# Patient Record
Sex: FEMALE | Race: WHITE | NOT HISPANIC OR LATINO | URBAN - METROPOLITAN AREA
[De-identification: names, ages, dates, MRNs, and addresses within clinical notes are randomized per-mention and may not be internally consistent; named-entity substitution may affect disease eponyms.]

---

## 2024-11-21 ENCOUNTER — APPOINTMENT (OUTPATIENT)
Dept: PRIMARY CARE | Facility: CLINIC | Age: 84
End: 2024-11-21
Payer: MEDICARE

## 2024-11-21 VITALS
OXYGEN SATURATION: 96 % | SYSTOLIC BLOOD PRESSURE: 169 MMHG | BODY MASS INDEX: 15.27 KG/M2 | HEIGHT: 66 IN | HEART RATE: 63 BPM | DIASTOLIC BLOOD PRESSURE: 74 MMHG | WEIGHT: 95 LBS

## 2024-11-21 DIAGNOSIS — I73.9 CLAUDICATION OF BOTH LOWER EXTREMITIES (CMS-HCC): ICD-10-CM

## 2024-11-21 DIAGNOSIS — R15.9 ENCOPRESIS: ICD-10-CM

## 2024-11-21 DIAGNOSIS — M1A.09X0 IDIOPATHIC CHRONIC GOUT OF MULTIPLE SITES WITHOUT TOPHUS: ICD-10-CM

## 2024-11-21 DIAGNOSIS — K52.9 CHRONIC DIARRHEA: ICD-10-CM

## 2024-11-21 DIAGNOSIS — I10 PRIMARY HYPERTENSION: Primary | ICD-10-CM

## 2024-11-21 DIAGNOSIS — M15.0 PRIMARY OSTEOARTHRITIS INVOLVING MULTIPLE JOINTS: ICD-10-CM

## 2024-11-21 DIAGNOSIS — M81.0 AGE-RELATED OSTEOPOROSIS WITHOUT CURRENT PATHOLOGICAL FRACTURE: ICD-10-CM

## 2024-11-21 DIAGNOSIS — G89.4 CHRONIC PAIN SYNDROME: ICD-10-CM

## 2024-11-21 DIAGNOSIS — M41.25 OTHER IDIOPATHIC SCOLIOSIS, THORACOLUMBAR REGION: ICD-10-CM

## 2024-11-21 PROBLEM — S82.831A CLOSED FRACTURE OF RIGHT DISTAL FIBULA: Status: RESOLVED | Noted: 2024-11-21 | Resolved: 2024-11-21

## 2024-11-21 PROBLEM — S99.921A INJURY OF RIGHT FOOT: Status: RESOLVED | Noted: 2024-11-21 | Resolved: 2024-11-21

## 2024-11-21 PROBLEM — G24.5 BLEPHAROSPASM: Status: RESOLVED | Noted: 2017-10-02 | Resolved: 2024-11-21

## 2024-11-21 PROBLEM — S99.911A INJURY OF RIGHT ANKLE: Status: RESOLVED | Noted: 2024-11-21 | Resolved: 2024-11-21

## 2024-11-21 PROBLEM — M76.891 HAMSTRING TENDONITIS OF RIGHT THIGH: Status: RESOLVED | Noted: 2024-11-21 | Resolved: 2024-11-21

## 2024-11-21 PROBLEM — S82.891A ANKLE FRACTURE, RIGHT: Status: RESOLVED | Noted: 2024-11-21 | Resolved: 2024-11-21

## 2024-11-21 PROCEDURE — 1159F MED LIST DOCD IN RCRD: CPT | Performed by: FAMILY MEDICINE

## 2024-11-21 PROCEDURE — 1158F ADVNC CARE PLAN TLK DOCD: CPT | Performed by: FAMILY MEDICINE

## 2024-11-21 PROCEDURE — 3078F DIAST BP <80 MM HG: CPT | Performed by: FAMILY MEDICINE

## 2024-11-21 PROCEDURE — 99203 OFFICE O/P NEW LOW 30 MIN: CPT | Performed by: FAMILY MEDICINE

## 2024-11-21 PROCEDURE — 1160F RVW MEDS BY RX/DR IN RCRD: CPT | Performed by: FAMILY MEDICINE

## 2024-11-21 PROCEDURE — 1125F AMNT PAIN NOTED PAIN PRSNT: CPT | Performed by: FAMILY MEDICINE

## 2024-11-21 PROCEDURE — 3077F SYST BP >= 140 MM HG: CPT | Performed by: FAMILY MEDICINE

## 2024-11-21 PROCEDURE — 1123F ACP DISCUSS/DSCN MKR DOCD: CPT | Performed by: FAMILY MEDICINE

## 2024-11-21 PROCEDURE — G2211 COMPLEX E/M VISIT ADD ON: HCPCS | Performed by: FAMILY MEDICINE

## 2024-11-21 RX ORDER — METOPROLOL SUCCINATE 50 MG/1
50 TABLET, EXTENDED RELEASE ORAL DAILY
COMMUNITY

## 2024-11-21 RX ORDER — ALLOPURINOL 300 MG/1
300 TABLET ORAL DAILY
COMMUNITY
Start: 2017-03-08

## 2024-11-21 RX ORDER — GABAPENTIN 300 MG/1
300 CAPSULE ORAL DAILY
COMMUNITY
Start: 2018-01-09

## 2024-11-21 ASSESSMENT — PATIENT HEALTH QUESTIONNAIRE - PHQ9
1. LITTLE INTEREST OR PLEASURE IN DOING THINGS: SEVERAL DAYS
10. IF YOU CHECKED OFF ANY PROBLEMS, HOW DIFFICULT HAVE THESE PROBLEMS MADE IT FOR YOU TO DO YOUR WORK, TAKE CARE OF THINGS AT HOME, OR GET ALONG WITH OTHER PEOPLE: SOMEWHAT DIFFICULT
SUM OF ALL RESPONSES TO PHQ9 QUESTIONS 1 AND 2: 2
2. FEELING DOWN, DEPRESSED OR HOPELESS: SEVERAL DAYS

## 2024-11-21 ASSESSMENT — COLUMBIA-SUICIDE SEVERITY RATING SCALE - C-SSRS
6. HAVE YOU EVER DONE ANYTHING, STARTED TO DO ANYTHING, OR PREPARED TO DO ANYTHING TO END YOUR LIFE?: NO
1. IN THE PAST MONTH, HAVE YOU WISHED YOU WERE DEAD OR WISHED YOU COULD GO TO SLEEP AND NOT WAKE UP?: NO
2. HAVE YOU ACTUALLY HAD ANY THOUGHTS OF KILLING YOURSELF?: NO

## 2024-11-21 ASSESSMENT — PAIN SCALES - GENERAL: PAINLEVEL_OUTOF10: 8

## 2024-11-21 NOTE — PROGRESS NOTES
"Subjective   Patient ID: Cecelia Chávez is a 84 y.o. female who presents for New Patient Visit (Pt has had diarrhea for a couple month now after eating /Pt has oosporosis and arthritis on both legs ).    The patient is new to the area and is transferring her care from Dr. Bassenite ([place]).  She is here to establish care.    1) HTN: uncontrolled, did not take metoprolol today, does not follow a low salt diet, no exercise routine.  2) Gout: controlled, compliant with allopurinol, prescribed by PCP.  3) Osteoporosis: controlled, no medication taken, takes Aleve as needed.    4) Arthritis: multiple areas, upper and lower extremities, and back, takes gabapentin which is not helping.    5) Scoliosis: stable, takes gabapentin which is not helping.  6) Claudication of both extremities: worsening, had an appointment to see vascular surgery in [place] prior to moving to [place], needs to establish with local vascular surgeon.  6) Chronic diarrhea/Encopresis: began months ago, patient had a CT of the abdomen and pelvis (possibly), no change in diet since the diarrhea began, anything she eats and drinks causes diarrhea.    Review of Systems   Gastrointestinal:  Positive for diarrhea.        Positive for encopuresis.   Musculoskeletal:  Positive for arthralgias.     Objective   /74 (BP Location: Right arm, Patient Position: Sitting, BP Cuff Size: Adult)   Pulse 63   Ht 1.676 m (5' 6\")   Wt (!) 43.1 kg (95 lb)   SpO2 96%   BMI 15.33 kg/m²     Physical Exam  Constitutional:       Appearance: Normal appearance.      Comments: Underweight, accompanied by daughter.   Cardiovascular:      Rate and Rhythm: Normal rate and regular rhythm.      Heart sounds: Normal heart sounds.   Pulmonary:      Effort: Pulmonary effort is normal.      Breath sounds: Normal breath sounds.   Neurological:      Mental Status: She is alert.     Assessment/Plan   Diagnoses and all orders for this visit:  Primary " hypertension  Uncontrolled  Patient instructed to resume taking metoprolol.  Low salt diet.  Regular exercise.  Manage weight.  Follow up for CPE after established with specialists.  Idiopathic chronic gout of multiple sites without tophus  Controlled  Continue with allopurinol.  Follow up for CPE after established with specialists.  Age-related osteoporosis without current pathological fracture  Controlled  No medication taken.  Continue with Aleve as needed.    Follow up for CPE after established with specialists.  Primary osteoarthritis involving multiple joints/Other idiopathic scoliosis, thoracolumbar region/Chronic pain syndrome  Stable   Continue with gabapentin until seen by pain management,  Referral to Pain Medicine  Await input.  Follow up as directed.  Claudication of both lower extremities (CMS-HCC)  Worsening  Referral to Vascular Surgery  Await input.  Follow up as directed.  Chronic diarrhea/Encopresis  Chronic  Referral to Gastroenterology  Await input.  Follow up as directed.

## 2024-11-23 PROBLEM — M1A.09X0 IDIOPATHIC CHRONIC GOUT OF MULTIPLE SITES WITHOUT TOPHUS: Status: ACTIVE | Noted: 2024-11-23

## 2024-11-23 PROBLEM — M41.25 OTHER IDIOPATHIC SCOLIOSIS, THORACOLUMBAR REGION: Status: ACTIVE | Noted: 2024-11-23

## 2024-11-23 PROBLEM — I10 PRIMARY HYPERTENSION: Status: ACTIVE | Noted: 2024-11-23

## 2024-11-23 PROBLEM — M15.0 PRIMARY OSTEOARTHRITIS INVOLVING MULTIPLE JOINTS: Status: ACTIVE | Noted: 2024-11-23

## 2024-11-23 ASSESSMENT — ENCOUNTER SYMPTOMS
DIARRHEA: 1
ARTHRALGIAS: 1

## 2024-12-17 ENCOUNTER — APPOINTMENT (OUTPATIENT)
Dept: PAIN MEDICINE | Facility: CLINIC | Age: 84
End: 2024-12-17
Payer: MEDICARE

## 2025-01-15 ENCOUNTER — APPOINTMENT (OUTPATIENT)
Dept: VASCULAR SURGERY | Facility: HOSPITAL | Age: 85
End: 2025-01-15
Payer: MEDICARE

## 2025-02-10 ENCOUNTER — APPOINTMENT (OUTPATIENT)
Dept: RADIOLOGY | Facility: HOSPITAL | Age: 85
DRG: 521 | End: 2025-02-10
Payer: MEDICARE

## 2025-02-10 ENCOUNTER — HOSPITAL ENCOUNTER (INPATIENT)
Facility: HOSPITAL | Age: 85
DRG: 521 | End: 2025-02-10
Admitting: INTERNAL MEDICINE
Payer: MEDICARE

## 2025-02-10 DIAGNOSIS — S72.002A CLOSED DISPLACED FRACTURE OF LEFT FEMORAL NECK: ICD-10-CM
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
## 2025-02-10 DIAGNOSIS — S72.032A CLOSED DISPLACED MIDCERVICAL FRACTURE OF LEFT FEMUR, INITIAL ENCOUNTER: ICD-10-CM

## 2025-02-10 DIAGNOSIS — R41.0 DELIRIUM: ICD-10-CM

## 2025-02-10 LAB
ALBUMIN SERPL BCP-MCNC: 4 G/DL (ref 3.4–5)
ALP SERPL-CCNC: 89 U/L (ref 33–136)
ALT SERPL W P-5'-P-CCNC: 8 U/L (ref 7–45)
ANION GAP SERPL CALCULATED.3IONS-SCNC: 11 MMOL/L (ref 10–20)
APTT PPP: 22.9 SECONDS (ref 22–32.5)
AST SERPL W P-5'-P-CCNC: 18 U/L (ref 9–39)
BASOPHILS # BLD AUTO: 0.06 X10*3/UL (ref 0–0.1)
BASOPHILS NFR BLD AUTO: 0.8 %
BILIRUB SERPL-MCNC: 0.4 MG/DL (ref 0–1.2)
BUN SERPL-MCNC: 19 MG/DL (ref 6–23)
CALCIUM SERPL-MCNC: 9.2 MG/DL (ref 8.6–10.3)
CHLORIDE SERPL-SCNC: 106 MMOL/L (ref 98–107)
CK SERPL-CCNC: 92 U/L (ref 0–215)
CO2 SERPL-SCNC: 29 MMOL/L (ref 21–32)
CREAT SERPL-MCNC: 0.94 MG/DL (ref 0.5–1.05)
EGFRCR SERPLBLD CKD-EPI 2021: 61 ML/MIN/1.73M*2
EOSINOPHIL # BLD AUTO: 0.1 X10*3/UL (ref 0–0.4)
EOSINOPHIL NFR BLD AUTO: 1.4 %
ERYTHROCYTE [DISTWIDTH] IN BLOOD BY AUTOMATED COUNT: 13.1 % (ref 11.5–14.5)
GLUCOSE SERPL-MCNC: 127 MG/DL (ref 74–99)
HCT VFR BLD AUTO: 35.2 % (ref 36–46)
HGB BLD-MCNC: 11.6 G/DL (ref 12–16)
IMM GRANULOCYTES # BLD AUTO: 0.02 X10*3/UL (ref 0–0.5)
IMM GRANULOCYTES NFR BLD AUTO: 0.3 % (ref 0–0.9)
INR PPP: 1.1 (ref 0.9–1.2)
LYMPHOCYTES # BLD AUTO: 0.93 X10*3/UL (ref 0.8–3)
LYMPHOCYTES NFR BLD AUTO: 12.7 %
MCH RBC QN AUTO: 33 PG (ref 26–34)
MCHC RBC AUTO-ENTMCNC: 33 G/DL (ref 32–36)
MCV RBC AUTO: 100 FL (ref 80–100)
MONOCYTES # BLD AUTO: 0.46 X10*3/UL (ref 0.05–0.8)
MONOCYTES NFR BLD AUTO: 6.3 %
NEUTROPHILS # BLD AUTO: 5.74 X10*3/UL (ref 1.6–5.5)
NEUTROPHILS NFR BLD AUTO: 78.5 %
NRBC BLD-RTO: 0 /100 WBCS (ref 0–0)
PLATELET # BLD AUTO: 214 X10*3/UL (ref 150–450)
POTASSIUM SERPL-SCNC: 3.8 MMOL/L (ref 3.5–5.3)
PROT SERPL-MCNC: 6.4 G/DL (ref 6.4–8.2)
PROTHROMBIN TIME: 11.4 SECONDS (ref 9.3–12.7)
RBC # BLD AUTO: 3.51 X10*6/UL (ref 4–5.2)
SODIUM SERPL-SCNC: 142 MMOL/L (ref 136–145)
WBC # BLD AUTO: 7.3 X10*3/UL (ref 4.4–11.3)

## 2025-02-10 PROCEDURE — 36415 COLL VENOUS BLD VENIPUNCTURE: CPT | Performed by: PHYSICIAN ASSISTANT

## 2025-02-10 PROCEDURE — 96375 TX/PRO/DX INJ NEW DRUG ADDON: CPT

## 2025-02-10 PROCEDURE — 72128 CT CHEST SPINE W/O DYE: CPT

## 2025-02-10 PROCEDURE — 70450 CT HEAD/BRAIN W/O DYE: CPT | Performed by: RADIOLOGY

## 2025-02-10 PROCEDURE — 72128 CT CHEST SPINE W/O DYE: CPT | Performed by: RADIOLOGY

## 2025-02-10 PROCEDURE — 72125 CT NECK SPINE W/O DYE: CPT | Performed by: RADIOLOGY

## 2025-02-10 PROCEDURE — 72131 CT LUMBAR SPINE W/O DYE: CPT | Performed by: RADIOLOGY

## 2025-02-10 PROCEDURE — 85025 COMPLETE CBC W/AUTO DIFF WBC: CPT | Performed by: PHYSICIAN ASSISTANT

## 2025-02-10 PROCEDURE — 71045 X-RAY EXAM CHEST 1 VIEW: CPT | Performed by: RADIOLOGY

## 2025-02-10 PROCEDURE — 71045 X-RAY EXAM CHEST 1 VIEW: CPT

## 2025-02-10 PROCEDURE — 2500000004 HC RX 250 GENERAL PHARMACY W/ HCPCS (ALT 636 FOR OP/ED): Performed by: PHYSICIAN ASSISTANT

## 2025-02-10 PROCEDURE — 72125 CT NECK SPINE W/O DYE: CPT

## 2025-02-10 PROCEDURE — 70450 CT HEAD/BRAIN W/O DYE: CPT

## 2025-02-10 PROCEDURE — 99223 1ST HOSP IP/OBS HIGH 75: CPT | Performed by: INTERNAL MEDICINE

## 2025-02-10 PROCEDURE — 73502 X-RAY EXAM HIP UNI 2-3 VIEWS: CPT | Mod: LT

## 2025-02-10 PROCEDURE — 1210000001 HC SEMI-PRIVATE ROOM DAILY

## 2025-02-10 PROCEDURE — 72131 CT LUMBAR SPINE W/O DYE: CPT

## 2025-02-10 PROCEDURE — 82550 ASSAY OF CK (CPK): CPT | Performed by: PHYSICIAN ASSISTANT

## 2025-02-10 PROCEDURE — 85730 THROMBOPLASTIN TIME PARTIAL: CPT | Performed by: PHYSICIAN ASSISTANT

## 2025-02-10 PROCEDURE — 96374 THER/PROPH/DIAG INJ IV PUSH: CPT

## 2025-02-10 PROCEDURE — 96376 TX/PRO/DX INJ SAME DRUG ADON: CPT

## 2025-02-10 PROCEDURE — 80053 COMPREHEN METABOLIC PANEL: CPT | Performed by: PHYSICIAN ASSISTANT

## 2025-02-10 PROCEDURE — 96361 HYDRATE IV INFUSION ADD-ON: CPT

## 2025-02-10 PROCEDURE — 73502 X-RAY EXAM HIP UNI 2-3 VIEWS: CPT | Mod: LEFT SIDE | Performed by: RADIOLOGY

## 2025-02-10 PROCEDURE — 85610 PROTHROMBIN TIME: CPT | Performed by: PHYSICIAN ASSISTANT

## 2025-02-10 PROCEDURE — 99285 EMERGENCY DEPT VISIT HI MDM: CPT | Mod: 25

## 2025-02-10 RX ORDER — ONDANSETRON HYDROCHLORIDE 2 MG/ML
4 INJECTION, SOLUTION INTRAVENOUS EVERY 4 HOURS PRN
Status: DISCONTINUED | OUTPATIENT
Start: 2025-02-10 | End: 2025-02-17 | Stop reason: HOSPADM

## 2025-02-10 RX ORDER — MORPHINE SULFATE 2 MG/ML
2 INJECTION, SOLUTION INTRAMUSCULAR; INTRAVENOUS
Status: DISCONTINUED | OUTPATIENT
Start: 2025-02-10 | End: 2025-02-11 | Stop reason: ALTCHOICE

## 2025-02-10 RX ORDER — HEPARIN SODIUM 5000 [USP'U]/ML
5000 INJECTION, SOLUTION INTRAVENOUS; SUBCUTANEOUS ONCE
Status: COMPLETED | OUTPATIENT
Start: 2025-02-10 | End: 2025-02-11

## 2025-02-10 RX ORDER — FENTANYL CITRATE 50 UG/ML
100 INJECTION, SOLUTION INTRAMUSCULAR; INTRAVENOUS ONCE
Status: COMPLETED | OUTPATIENT
Start: 2025-02-10 | End: 2025-02-10

## 2025-02-10 RX ORDER — ONDANSETRON HYDROCHLORIDE 2 MG/ML
4 INJECTION, SOLUTION INTRAVENOUS ONCE
Status: COMPLETED | OUTPATIENT
Start: 2025-02-10 | End: 2025-02-10

## 2025-02-10 RX ORDER — FENTANYL CITRATE 50 UG/ML
50 INJECTION, SOLUTION INTRAMUSCULAR; INTRAVENOUS ONCE
Status: COMPLETED | OUTPATIENT
Start: 2025-02-10 | End: 2025-02-10

## 2025-02-10 RX ORDER — CLOTRIMAZOLE 1 %
CREAM (GRAM) TOPICAL 2 TIMES DAILY
Status: DISCONTINUED | OUTPATIENT
Start: 2025-02-10 | End: 2025-02-17 | Stop reason: HOSPADM

## 2025-02-10 RX ORDER — PANTOPRAZOLE SODIUM 40 MG/1
40 TABLET, DELAYED RELEASE ORAL
Status: DISCONTINUED | OUTPATIENT
Start: 2025-02-11 | End: 2025-02-17 | Stop reason: HOSPADM

## 2025-02-10 RX ORDER — MORPHINE SULFATE 4 MG/ML
4 INJECTION, SOLUTION INTRAMUSCULAR; INTRAVENOUS ONCE
Status: COMPLETED | OUTPATIENT
Start: 2025-02-10 | End: 2025-02-10

## 2025-02-10 RX ADMIN — MORPHINE SULFATE 4 MG: 4 INJECTION, SOLUTION INTRAMUSCULAR; INTRAVENOUS at 16:14

## 2025-02-10 RX ADMIN — FENTANYL CITRATE 100 MCG: 50 INJECTION, SOLUTION INTRAMUSCULAR; INTRAVENOUS at 19:22

## 2025-02-10 RX ADMIN — ONDANSETRON 4 MG: 2 INJECTION INTRAMUSCULAR; INTRAVENOUS at 16:12

## 2025-02-10 RX ADMIN — SODIUM CHLORIDE 500 ML: 900 INJECTION, SOLUTION INTRAVENOUS at 16:13

## 2025-02-10 RX ADMIN — FENTANYL CITRATE 50 MCG: 0.05 INJECTION, SOLUTION INTRAMUSCULAR; INTRAVENOUS at 17:32

## 2025-02-10 RX ADMIN — FENTANYL CITRATE 50 MCG: 0.05 INJECTION, SOLUTION INTRAMUSCULAR; INTRAVENOUS at 21:13

## 2025-02-10 ASSESSMENT — PAIN SCALES - GENERAL
PAINLEVEL_OUTOF10: 10 - WORST POSSIBLE PAIN
PAINLEVEL_OUTOF10: 10 - WORST POSSIBLE PAIN

## 2025-02-10 ASSESSMENT — LIFESTYLE VARIABLES
EVER HAD A DRINK FIRST THING IN THE MORNING TO STEADY YOUR NERVES TO GET RID OF A HANGOVER: NO
TOTAL SCORE: 0
HAVE YOU EVER FELT YOU SHOULD CUT DOWN ON YOUR DRINKING: NO
EVER FELT BAD OR GUILTY ABOUT YOUR DRINKING: NO
HAVE PEOPLE ANNOYED YOU BY CRITICIZING YOUR DRINKING: NO

## 2025-02-10 ASSESSMENT — PAIN DESCRIPTION - LOCATION: LOCATION: HIP

## 2025-02-10 ASSESSMENT — PAIN DESCRIPTION - ORIENTATION: ORIENTATION: LEFT

## 2025-02-10 NOTE — ED PROVIDER NOTES
HPI   Chief Complaint   Patient presents with    Fall       This is a 84-year-old female presenting to the emergency department for mechanical fall.  Patient states that she recently moved into a new home.  She states that she was unpacking when she was going to walk to her walker because typically she uses her walker when she felt her legs give out and she fell to the ground.  She states she did hit her head.  She does not take any blood thinners.  There is no loss of conscious.  Patient states that she was not able to get up off the ground due to pain in her left hip.  She states that she was on the ground for approximately 1 hour before her daughter came to her house and found her on the ground.  Patient states that she is having pain in her left hip in the midportion of her back.          Please see HPI for pertinent positive and negative ROS. The remaining 10 point ROS negative.         Patient History   Past Medical History:   Diagnosis Date    Ankle fracture, right 11/21/2024    Closed fracture of right distal fibula 11/21/2024    Hamstring tendonitis of right thigh 11/21/2024    Heart disease     Hypertension     Injury of right ankle 11/21/2024    Injury of right foot 11/21/2024     Past Surgical History:   Procedure Laterality Date    HYSTERECTOMY      SPINE SURGERY       Family History   Problem Relation Name Age of Onset    Heart disease Son       Social History     Tobacco Use    Smoking status: Some Days     Current packs/day: 0.25     Average packs/day: 0.3 packs/day for 54.2 years (13.5 ttl pk-yrs)     Types: Cigarettes     Start date: 12/7/1970    Smokeless tobacco: Never   Vaping Use    Vaping status: Never Used   Substance Use Topics    Alcohol use: Yes    Drug use: Never       Physical Exam   ED Triage Vitals   Temperature Heart Rate Respirations BP   02/10/25 1533 02/10/25 1533 02/10/25 1533 02/10/25 1535   37 °C (98.6 °F) (!) 103 20 (!) 185/115      Pulse Ox Temp Source Heart Rate Source  Patient Position   02/10/25 1533 02/10/25 1533 02/10/25 1533 --   100 % Oral Monitor       BP Location FiO2 (%)     02/10/25 1533 --     Right arm        Physical Exam  GENERAL APPEARANCE: Awake and alert. No acute respiratory distress.   VITAL SIGNS: As per the nurses' triage record.  HEENT: Normocephalic, atraumatic. Extraocular muscles are intact. Conjunctiva are pink. Negative scleral icterus.   NECK: Soft, nontender and supple, full gross ROM, no bony step-offs appreciated over cervical spine.  CHEST: Nontender to palpation. Clear to auscultation bilaterally. No rales, rhonchi, or wheezing. Symmetric rise and fall of chest wall.   HEART: Clear S1 and S2. Regular rate and rhythm.  Strong and equal pulses in the extremities.  ABDOMEN: Soft, nontender, nondistended  MUSCULOSKELETAL: Patient has point tenderness palpation over the thoracic midline spinous processes, no bony step-offs appreciated.  She also has tenderness to palpation over the left hip region.  Patient is not able to move her left leg off the bed secondary to pain.  She does not have any pain in the long bones of upper or lower extremities bilaterally.  NEUROLOGICAL: Awake, alert and oriented x 3.  GCS 15.  Motor power intact in the upper and lower extremities. Sensation is intact to light touch in the upper and lower extremities. Patient answering questions appropriately.   IMMUNOLOGICAL: No lymphatic streaking noted  DERMATOLOGIC: Warm and dry   PYSCH: Cooperative with appropriate mood and affect.    ED Course & MDM   ED Course as of 02/11/25 1532   Mon Feb 10, 2025   1721 Call put out to Ortho for acute angulated left femoral neck hip fracture [SH]   1740 I did speak with Dr. Patel who would like patient admitted left femoral neck fracture. [SH]      ED Course User Index  [SH] Alla Sanchez PA-C         Diagnoses as of 02/11/25 1532   Fall, initial encounter   Closed displaced fracture of left femoral neck                 No data recorded      Valeriano Coma Scale Score: 15 (02/11/25 0242 : Cole Garcia RN)                           Medical Decision Making  Parts of this chart have been completed using voice recognition software. Please excuse any errors of transcription.  My thought process and reason for plan has been formulated from the time that I saw the patient until the time of disposition and is not specific to one specific moment during their visit and furthermore my MDM encompasses this entire chart and not only this text box.      HPI: Detailed above.    Exam: A medically appropriate exam performed, outlined above, given the known history and presentation.    History obtained from: Patient     Medications given during visit:  Medications   allopurinol (Zyloprim) tablet 300 mg (300 mg oral Given 2/11/25 0858)   gabapentin (Neurontin) capsule 300 mg (300 mg oral Given 2/11/25 0858)   metoprolol succinate XL (Toprol-XL) 24 hr tablet 50 mg (50 mg oral Given 2/11/25 0858)   pantoprazole (ProtoNix) EC tablet 40 mg (40 mg oral Given 2/11/25 0815)   ondansetron (Zofran) injection 4 mg (has no administration in time range)   clotrimazole (Lotrimin) 1 % cream ( Topical Given 2/11/25 0818)   morphine injection 2 mg (2 mg intravenous Given 2/11/25 0813)   morphine injection 4 mg (4 mg intravenous Given 2/11/25 1530)   morphine injection 4 mg (4 mg intravenous Given 2/10/25 1614)   ondansetron (Zofran) injection 4 mg (4 mg intravenous Given 2/10/25 1612)   sodium chloride 0.9 % bolus 500 mL (0 mL intravenous Stopped 2/10/25 1715)   fentaNYL PF (Sublimaze) injection 50 mcg (50 mcg intravenous Given 2/10/25 1732)   fentaNYL PF (Sublimaze) injection 100 mcg (100 mcg intravenous Given 2/10/25 1922)   fentaNYL PF (Sublimaze) injection 50 mcg (50 mcg intravenous Given 2/10/25 2113)   heparin (porcine) injection 5,000 Units (5,000 Units subcutaneous Given 2/11/25 0034)   morphine injection 2 mg (2 mg intravenous Given 2/11/25 0856)   sodium chloride 0.9 %  bolus 500 mL (0 mL intravenous Stopped 2/11/25 1523)        Diagnostic/tests  Labs Reviewed   CBC WITH AUTO DIFFERENTIAL - Abnormal       Result Value    WBC 7.3      nRBC 0.0      RBC 3.51 (*)     Hemoglobin 11.6 (*)     Hematocrit 35.2 (*)           MCH 33.0      MCHC 33.0      RDW 13.1      Platelets 214      Neutrophils % 78.5      Immature Granulocytes %, Automated 0.3      Lymphocytes % 12.7      Monocytes % 6.3      Eosinophils % 1.4      Basophils % 0.8      Neutrophils Absolute 5.74 (*)     Immature Granulocytes Absolute, Automated 0.02      Lymphocytes Absolute 0.93      Monocytes Absolute 0.46      Eosinophils Absolute 0.10      Basophils Absolute 0.06     COMPREHENSIVE METABOLIC PANEL - Abnormal    Glucose 127 (*)     Sodium 142      Potassium 3.8      Chloride 106      Bicarbonate 29      Anion Gap 11      Urea Nitrogen 19      Creatinine 0.94      eGFR 61      Calcium 9.2      Albumin 4.0      Alkaline Phosphatase 89      Total Protein 6.4      AST 18      Bilirubin, Total 0.4      ALT 8     PROTIME-INR - Normal    Protime 11.4      INR 1.1      Narrative:     INR Therapeutic Range: 2.0-3.5   APTT - Normal    aPTT 22.9     CREATINE KINASE - Normal    Creatine Kinase 92        CT lumbar spine wo IV contrast   Final Result   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             MACRO:   None.        Signed by: Jono Hendrix 2/10/2025 8:38 PM   Dictation workstation:   [id number]      CT thoracic spine wo IV contrast   Final Result   Osteopenia without acute fracture or dislocation.             MACRO:   None.        Signed by: Jono Hendrix 2/10/2025 8:33 PM   Dictation workstation:   [id number]      CT head wo IV contrast   Final Result   No acute intracranial abnormality.             MACRO:   None.        Signed by: Jono Hendrix 2/10/2025 8:21 PM   Dictation workstation:   [id number]      CT cervical spine wo IV contrast   Final Result   No acute  fracture or traumatic malalignment of the cervical spine.        MACRO:   None.        Signed by: Jono Hamla 2/10/2025 8:29 PM   Dictation workstation:   [id number]      XR hip left with pelvis when performed 2 or 3 views   Final Result   Acute angulated left femoral neck fracture.             MACRO:   None        Signed by: Boza Alvarez 2/10/2025 5:08 PM   Dictation workstation:   [id number]      XR chest 1 view   Final Result   1.  Probably benign lucencies overlying the left mid chest. No   evident acute cardiopulmonary abnormality.   2. Marked prominence of the aortic shadow.                  MACRO:   None        Signed by: Boaz Alvarez 2/10/2025 5:09 PM   Dictation workstation:   [id number]           Considerations/further MDM:  Patient was seen in conjucntion with my supervising physician,  Dr. Mayberry. Please refer to his note.    Imaging was ordered based off patient's history and physical exam findings.    CT head without IV contrast shows no acute cranial abnormality.  CT cervical spine without IV contrast is no acute fracture or traumatic malalignment of the cervical spine.  CT thoracic spine shows osteopenia with no fracture.  CT lumbar spine shows moderate to advanced underlying degenerative changes without acute fracture.  Chest x-ray shows probable benign lucencies.  X-ray of the left hip shows acute angulated femoral neck fracture.  Laboratory evaluation was grossly unremarkable.  Patient was treated with IV fentanyl and IV morphine with improvement of her pain.  She will be admitted for further management of acute femoral neck fracture.  Patient and daughter updated on imaging results and need for admission for left femoral neck fracture.  Patient was admitted in stable condition to hospitalistDr. Nayak.      Procedure  Procedures     Alla Sanchez PA-C  02/11/25 1534

## 2025-02-10 NOTE — ED TRIAGE NOTES
Pt fell at home. Pt lives with her daughter. Pt states that she is weak on the left side from previous stroke. Pt states she's unable to move her left side. Pt states she has back pain, was down for about an hour before squad got there

## 2025-02-10 NOTE — ED PROVIDER NOTES
THIS IS MY MARANDA SUPERVISORY AND SHARED VISIT NOTE:    I personally saw the patient and made/approved the management plan and take responsibility for the patient management.    History: Patient is an 84-year-old female presents today with a chief complaint of a fall.  Patient had a mechanical fall, she is weak on her left side from her prior stroke, she is unable to move her left side, she fell, and had left hip pain, is unable to walk, usually she uses a walker, her legs give out and she fell to the ground, she did hit her head, no blood thinners, no loss of consciousness, unable to get off the ground due to the pain in her left hip, she is on the ground around an hour before the daughter got to the house patient denies any headache, neck pain, denies any chest pain shortness of breath, states the pain is localized to her left hip    Exam: GENERAL APPEARANCE: Awake and alert.     HEENT: Normocephalic, atraumatic. Extraocular muscles are intact. Pupils equal round and reactive to light.  CHEST: Nontender to palpation. Clear to auscultation bilaterally. No rales, rhonchi, or wheezing.   HEART: S1, S2. Regular rate and rhythm. No murmurs, gallops or rubs.  Strong and equal pulses in the extremities.   ABDOMEN: Soft,.  non-tender.  No rebound or guarding, bowel sounds normal x 4 quadrants  NEUROLOGICAL: Awake, alert and oriented x 3.   MSK: External rotation of the left lower extremity    MDM: Patient seen and evaluated at bedside, patient is in no acute distress.  I will order a CBC, CMP, CK, coags, x-ray of the hip, x-ray chest, CT head, CT cervical spine, CT thoracic spine, CT lumbar spine. Differential diagnosis includes but is not limited to hip fracture, hip dislocation, accidental fall, rhabdomyolysis, intracranial hemorrhage, cervical spinal fracture.  Patient's lab work shows a hemoglobin 11.6, hematocrit 35.2, patient's imaging shows an acute angulated left femoral neck fracture, patient will be admitted to the  general medicine team with an orthopedics consultation.    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             MACRO:   None.        Signed by: Jono Hendrix 2/10/2025 8:38 PM   Dictation workstation:   [id number]      CT thoracic spine wo IV contrast   Final Result   Osteopenia without acute fracture or dislocation.             MACRO:   None.        Signed by: Jono Hendrix 2/10/2025 8:33 PM   Dictation workstation:   [id number]      CT head wo IV contrast   Final Result   No acute intracranial abnormality.             MACRO:   None.        Signed by: Jono Hendrix 2/10/2025 8:21 PM   Dictation workstation:   [id number]      CT cervical spine wo IV contrast   Final Result   No acute fracture or traumatic malalignment of the cervical spine.        MACRO:   None.        Signed by: Jono Hendrix 2/10/2025 8:29 PM   Dictation workstation:   [id number]      XR hip left with pelvis when performed 2 or 3 views   Final Result   Acute angulated left femoral neck fracture.             MACRO:   None        Signed by: Boaz Alvarez 2/10/2025 5:08 PM   Dictation workstation:   [id number]      XR chest 1 view   Final Result   1.  Probably benign lucencies overlying the left mid chest. No   evident acute cardiopulmonary abnormality.   2. Marked prominence of the aortic shadow.                  MACRO:   None        Signed by: Boaz Alvarez 2/10/2025 5:09 PM   Dictation workstation:   [id number]        Results for orders placed or performed during the hospital encounter of 02/10/25   CBC and Auto Differential    Collection Time: 02/10/25  3:59 PM   Result Value Ref Range    WBC 7.3 4.4 - 11.3 x10*3/uL    nRBC 0.0 0.0 - 0.0 /100 WBCs    RBC 3.51 (L) 4.00 - 5.20 x10*6/uL    Hemoglobin 11.6 (L) 12.0 - 16.0 g/dL    Hematocrit 35.2 (L) 36.0 - 46.0 %     80 - 100  fL    MCH 33.0 26.0 - 34.0 pg    MCHC 33.0 32.0 - 36.0 g/dL    RDW 13.1 11.5 - 14.5 %    Platelets 214 150 - 450 x10*3/uL    Neutrophils % 78.5 40.0 - 80.0 %    Immature Granulocytes %, Automated 0.3 0.0 - 0.9 %    Lymphocytes % 12.7 13.0 - 44.0 %    Monocytes % 6.3 2.0 - 10.0 %    Eosinophils % 1.4 0.0 - 6.0 %    Basophils % 0.8 0.0 - 2.0 %    Neutrophils Absolute 5.74 (H) 1.60 - 5.50 x10*3/uL    Immature Granulocytes Absolute, Automated 0.02 0.00 - 0.50 x10*3/uL    Lymphocytes Absolute 0.93 0.80 - 3.00 x10*3/uL    Monocytes Absolute 0.46 0.05 - 0.80 x10*3/uL    Eosinophils Absolute 0.10 0.00 - 0.40 x10*3/uL    Basophils Absolute 0.06 0.00 - 0.10 x10*3/uL   Comprehensive metabolic panel    Collection Time: 02/10/25  3:59 PM   Result Value Ref Range    Glucose 127 (H) 74 - 99 mg/dL    Sodium 142 136 - 145 mmol/L    Potassium 3.8 3.5 - 5.3 mmol/L    Chloride 106 98 - 107 mmol/L    Bicarbonate 29 21 - 32 mmol/L    Anion Gap 11 10 - 20 mmol/L    Urea Nitrogen 19 6 - 23 mg/dL    Creatinine 0.94 0.50 - 1.05 mg/dL    eGFR 61 >60 mL/min/1.73m*2    Calcium 9.2 8.6 - 10.3 mg/dL    Albumin 4.0 3.4 - 5.0 g/dL    Alkaline Phosphatase 89 33 - 136 U/L    Total Protein 6.4 6.4 - 8.2 g/dL    AST 18 9 - 39 U/L    Bilirubin, Total 0.4 0.0 - 1.2 mg/dL    ALT 8 7 - 45 U/L   Protime-INR    Collection Time: 02/10/25  3:59 PM   Result Value Ref Range    Protime 11.4 9.3 - 12.7 seconds    INR 1.1 0.9 - 1.2   APTT    Collection Time: 02/10/25  3:59 PM   Result Value Ref Range    aPTT 22.9 22.0 - 32.5 seconds   Creatine Kinase    Collection Time: 02/10/25  3:59 PM   Result Value Ref Range    Creatine Kinase 92 0 - 215 U/L     .    Please see MARANDA note for further details    Sections of this report were created using voice-to-text technology and may contain errors in translation    Jamie Mayberry DO  Emergency Medicine       Jamie Mayberry DO  02/11/25 1533

## 2025-02-11 ENCOUNTER — APPOINTMENT (OUTPATIENT)
Dept: CARDIOLOGY | Facility: HOSPITAL | Age: 85
DRG: 521 | End: 2025-02-11
Payer: MEDICARE

## 2025-02-11 LAB
BASOPHILS # BLD AUTO: 0.04 X10*3/UL (ref 0–0.1)
BASOPHILS NFR BLD AUTO: 0.6 %
EOSINOPHIL # BLD AUTO: 0.06 X10*3/UL (ref 0–0.4)
EOSINOPHIL NFR BLD AUTO: 0.9 %
ERYTHROCYTE [DISTWIDTH] IN BLOOD BY AUTOMATED COUNT: 13.1 % (ref 11.5–14.5)
HCT VFR BLD AUTO: 29.9 % (ref 36–46)
HGB BLD-MCNC: 10 G/DL (ref 12–16)
IMM GRANULOCYTES # BLD AUTO: 0.02 X10*3/UL (ref 0–0.5)
IMM GRANULOCYTES NFR BLD AUTO: 0.3 % (ref 0–0.9)
LYMPHOCYTES # BLD AUTO: 0.9 X10*3/UL (ref 0.8–3)
LYMPHOCYTES NFR BLD AUTO: 14.1 %
MCH RBC QN AUTO: 33.1 PG (ref 26–34)
MCHC RBC AUTO-ENTMCNC: 33.4 G/DL (ref 32–36)
MCV RBC AUTO: 99 FL (ref 80–100)
MONOCYTES # BLD AUTO: 0.65 X10*3/UL (ref 0.05–0.8)
MONOCYTES NFR BLD AUTO: 10.2 %
NEUTROPHILS # BLD AUTO: 4.73 X10*3/UL (ref 1.6–5.5)
NEUTROPHILS NFR BLD AUTO: 73.9 %
NRBC BLD-RTO: 0 /100 WBCS (ref 0–0)
PLATELET # BLD AUTO: 162 X10*3/UL (ref 150–450)
RBC # BLD AUTO: 3.02 X10*6/UL (ref 4–5.2)
WBC # BLD AUTO: 6.4 X10*3/UL (ref 4.4–11.3)

## 2025-02-11 PROCEDURE — 93005 ELECTROCARDIOGRAM TRACING: CPT

## 2025-02-11 PROCEDURE — 2500000004 HC RX 250 GENERAL PHARMACY W/ HCPCS (ALT 636 FOR OP/ED): Performed by: INTERNAL MEDICINE

## 2025-02-11 PROCEDURE — 2500000001 HC RX 250 WO HCPCS SELF ADMINISTERED DRUGS (ALT 637 FOR MEDICARE OP): Performed by: INTERNAL MEDICINE

## 2025-02-11 PROCEDURE — 99232 SBSQ HOSP IP/OBS MODERATE 35: CPT | Performed by: INTERNAL MEDICINE

## 2025-02-11 PROCEDURE — 36415 COLL VENOUS BLD VENIPUNCTURE: CPT | Performed by: INTERNAL MEDICINE

## 2025-02-11 PROCEDURE — 1100000001 HC PRIVATE ROOM DAILY

## 2025-02-11 PROCEDURE — 85025 COMPLETE CBC W/AUTO DIFF WBC: CPT | Performed by: INTERNAL MEDICINE

## 2025-02-11 RX ORDER — MORPHINE SULFATE 4 MG/ML
4 INJECTION, SOLUTION INTRAMUSCULAR; INTRAVENOUS
Status: DISCONTINUED | OUTPATIENT
Start: 2025-02-11 | End: 2025-02-17 | Stop reason: HOSPADM

## 2025-02-11 RX ORDER — MORPHINE SULFATE 4 MG/ML
2 INJECTION, SOLUTION INTRAMUSCULAR; INTRAVENOUS
Status: DISCONTINUED | OUTPATIENT
Start: 2025-02-11 | End: 2025-02-13

## 2025-02-11 RX ORDER — ASPIRIN 81 MG/1
81 TABLET ORAL DAILY
COMMUNITY
End: 2025-02-17 | Stop reason: HOSPADM

## 2025-02-11 RX ORDER — GABAPENTIN 300 MG/1
300 CAPSULE ORAL DAILY
Status: DISCONTINUED | OUTPATIENT
Start: 2025-02-11 | End: 2025-02-17 | Stop reason: HOSPADM

## 2025-02-11 RX ORDER — NAPROXEN SODIUM 220 MG
220 TABLET ORAL EVERY 12 HOURS PRN
COMMUNITY
End: 2025-02-17 | Stop reason: HOSPADM

## 2025-02-11 RX ORDER — BISMUTH SUBSALICYLATE 262 MG
1 TABLET,CHEWABLE ORAL DAILY
COMMUNITY

## 2025-02-11 RX ORDER — MORPHINE SULFATE 2 MG/ML
2 INJECTION, SOLUTION INTRAMUSCULAR; INTRAVENOUS ONCE
Status: COMPLETED | OUTPATIENT
Start: 2025-02-11 | End: 2025-02-11

## 2025-02-11 RX ORDER — ALLOPURINOL 300 MG/1
300 TABLET ORAL DAILY
Status: DISCONTINUED | OUTPATIENT
Start: 2025-02-11 | End: 2025-02-17 | Stop reason: HOSPADM

## 2025-02-11 RX ORDER — METOPROLOL SUCCINATE 50 MG/1
50 TABLET, EXTENDED RELEASE ORAL DAILY
Status: DISCONTINUED | OUTPATIENT
Start: 2025-02-11 | End: 2025-02-17 | Stop reason: HOSPADM

## 2025-02-11 RX ADMIN — METOPROLOL SUCCINATE 50 MG: 50 TABLET, EXTENDED RELEASE ORAL at 08:58

## 2025-02-11 RX ADMIN — MORPHINE SULFATE 2 MG: 2 INJECTION, SOLUTION INTRAMUSCULAR; INTRAVENOUS at 02:25

## 2025-02-11 RX ADMIN — HEPARIN SODIUM 5000 UNITS: 5000 INJECTION, SOLUTION INTRAVENOUS; SUBCUTANEOUS at 00:34

## 2025-02-11 RX ADMIN — CLOTRIMAZOLE: 10 CREAM TOPICAL at 08:18

## 2025-02-11 RX ADMIN — MORPHINE SULFATE 2 MG: 4 INJECTION, SOLUTION INTRAMUSCULAR; INTRAVENOUS at 08:13

## 2025-02-11 RX ADMIN — CLOTRIMAZOLE: 10 CREAM TOPICAL at 00:34

## 2025-02-11 RX ADMIN — MORPHINE SULFATE 2 MG: 2 INJECTION, SOLUTION INTRAMUSCULAR; INTRAVENOUS at 08:56

## 2025-02-11 RX ADMIN — MORPHINE SULFATE 4 MG: 4 INJECTION, SOLUTION INTRAMUSCULAR; INTRAVENOUS at 15:30

## 2025-02-11 RX ADMIN — SODIUM CHLORIDE 500 ML: 900 INJECTION, SOLUTION INTRAVENOUS at 14:11

## 2025-02-11 RX ADMIN — PANTOPRAZOLE SODIUM 40 MG: 40 TABLET, DELAYED RELEASE ORAL at 08:15

## 2025-02-11 RX ADMIN — MORPHINE SULFATE 2 MG: 2 INJECTION, SOLUTION INTRAMUSCULAR; INTRAVENOUS at 00:38

## 2025-02-11 RX ADMIN — GABAPENTIN 300 MG: 300 CAPSULE ORAL at 08:58

## 2025-02-11 RX ADMIN — MORPHINE SULFATE 2 MG: 2 INJECTION, SOLUTION INTRAMUSCULAR; INTRAVENOUS at 04:57

## 2025-02-11 RX ADMIN — ALLOPURINOL 300 MG: 300 TABLET ORAL at 08:58

## 2025-02-11 SDOH — ECONOMIC STABILITY: FOOD INSECURITY: HOW HARD IS IT FOR YOU TO PAY FOR THE VERY BASICS LIKE FOOD, HOUSING, MEDICAL CARE, AND HEATING?: PATIENT DECLINED

## 2025-02-11 SDOH — ECONOMIC STABILITY: FOOD INSECURITY
WITHIN THE PAST 12 MONTHS, YOU WORRIED THAT YOUR FOOD WOULD RUN OUT BEFORE YOU GOT THE MONEY TO BUY MORE.: PATIENT DECLINED

## 2025-02-11 SDOH — ECONOMIC STABILITY: INCOME INSECURITY
IN THE PAST 12 MONTHS HAS THE ELECTRIC, GAS, OIL, OR WATER COMPANY THREATENED TO SHUT OFF SERVICES IN YOUR HOME?: PATIENT DECLINED

## 2025-02-11 SDOH — SOCIAL STABILITY: SOCIAL INSECURITY: WITHIN THE LAST YEAR, HAVE YOU BEEN AFRAID OF YOUR PARTNER OR EX-PARTNER?: PATIENT DECLINED

## 2025-02-11 SDOH — SOCIAL STABILITY: SOCIAL INSECURITY: HAVE YOU HAD ANY THOUGHTS OF HARMING ANYONE ELSE?: UNABLE TO ASSESS

## 2025-02-11 SDOH — ECONOMIC STABILITY: HOUSING INSECURITY: AT ANY TIME IN THE PAST 12 MONTHS, WERE YOU HOMELESS OR LIVING IN A SHELTER (INCLUDING NOW)?: PATIENT DECLINED

## 2025-02-11 SDOH — SOCIAL STABILITY: SOCIAL INSECURITY: DO YOU FEEL UNSAFE GOING BACK TO THE PLACE WHERE YOU ARE LIVING?: UNABLE TO ASSESS

## 2025-02-11 SDOH — SOCIAL STABILITY: SOCIAL INSECURITY: WERE YOU ABLE TO COMPLETE ALL THE BEHAVIORAL HEALTH SCREENINGS?: NO

## 2025-02-11 SDOH — ECONOMIC STABILITY: FOOD INSECURITY: WITHIN THE PAST 12 MONTHS, THE FOOD YOU BOUGHT JUST DIDN'T LAST AND YOU DIDN'T HAVE MONEY TO GET MORE.: PATIENT DECLINED

## 2025-02-11 SDOH — SOCIAL STABILITY: SOCIAL INSECURITY
WITHIN THE LAST YEAR, HAVE YOU BEEN RAPED OR FORCED TO HAVE ANY KIND OF SEXUAL ACTIVITY BY YOUR PARTNER OR EX-PARTNER?: PATIENT DECLINED

## 2025-02-11 SDOH — SOCIAL STABILITY: SOCIAL INSECURITY: ARE THERE ANY APPARENT SIGNS OF INJURIES/BEHAVIORS THAT COULD BE RELATED TO ABUSE/NEGLECT?: UNABLE TO ASSESS

## 2025-02-11 SDOH — SOCIAL STABILITY: SOCIAL INSECURITY: ARE YOU OR HAVE YOU BEEN THREATENED OR ABUSED PHYSICALLY, EMOTIONALLY, OR SEXUALLY BY ANYONE?: UNABLE TO ASSESS

## 2025-02-11 SDOH — ECONOMIC STABILITY: TRANSPORTATION INSECURITY
IN THE PAST 12 MONTHS, HAS LACK OF TRANSPORTATION KEPT YOU FROM MEDICAL APPOINTMENTS OR FROM GETTING MEDICATIONS?: PATIENT DECLINED

## 2025-02-11 SDOH — SOCIAL STABILITY: SOCIAL INSECURITY
WITHIN THE LAST YEAR, HAVE YOU BEEN KICKED, HIT, SLAPPED, OR OTHERWISE PHYSICALLY HURT BY YOUR PARTNER OR EX-PARTNER?: PATIENT DECLINED

## 2025-02-11 SDOH — ECONOMIC STABILITY: HOUSING INSECURITY: IN THE LAST 12 MONTHS, WAS THERE A TIME WHEN YOU WERE NOT ABLE TO PAY THE MORTGAGE OR RENT ON TIME?: PATIENT DECLINED

## 2025-02-11 SDOH — SOCIAL STABILITY: SOCIAL INSECURITY: HAVE YOU HAD THOUGHTS OF HARMING ANYONE ELSE?: NO

## 2025-02-11 SDOH — SOCIAL STABILITY: SOCIAL INSECURITY: HAS ANYONE EVER THREATENED TO HURT YOUR FAMILY OR YOUR PETS?: UNABLE TO ASSESS

## 2025-02-11 SDOH — ECONOMIC STABILITY: HOUSING INSECURITY: IN THE PAST 12 MONTHS, HOW MANY TIMES HAVE YOU MOVED WHERE YOU WERE LIVING?: 0

## 2025-02-11 SDOH — SOCIAL STABILITY: SOCIAL INSECURITY: ABUSE: ADULT

## 2025-02-11 SDOH — SOCIAL STABILITY: SOCIAL INSECURITY: DOES ANYONE TRY TO KEEP YOU FROM HAVING/CONTACTING OTHER FRIENDS OR DOING THINGS OUTSIDE YOUR HOME?: UNABLE TO ASSESS

## 2025-02-11 SDOH — SOCIAL STABILITY: SOCIAL INSECURITY
WITHIN THE LAST YEAR, HAVE YOU BEEN HUMILIATED OR EMOTIONALLY ABUSED IN OTHER WAYS BY YOUR PARTNER OR EX-PARTNER?: PATIENT DECLINED

## 2025-02-11 SDOH — SOCIAL STABILITY: SOCIAL INSECURITY: DO YOU FEEL ANYONE HAS EXPLOITED OR TAKEN ADVANTAGE OF YOU FINANCIALLY OR OF YOUR PERSONAL PROPERTY?: UNABLE TO ASSESS

## 2025-02-11 ASSESSMENT — ACTIVITIES OF DAILY LIVING (ADL)
LACK_OF_TRANSPORTATION: PATIENT DECLINED
HEARING - LEFT EAR: FUNCTIONAL
FEEDING YOURSELF: INDEPENDENT
WALKS IN HOME: NEEDS ASSISTANCE
TOILETING: NEEDS ASSISTANCE
PATIENT'S MEMORY ADEQUATE TO SAFELY COMPLETE DAILY ACTIVITIES?: UNABLE TO ASSESS
ADEQUATE_TO_COMPLETE_ADL: UNABLE TO ASSESS
DRESSING YOURSELF: NEEDS ASSISTANCE
LACK_OF_TRANSPORTATION: PATIENT DECLINED
HEARING - RIGHT EAR: FUNCTIONAL
ASSISTIVE_DEVICE: WALKER
GROOMING: NEEDS ASSISTANCE
BATHING: NEEDS ASSISTANCE
JUDGMENT_ADEQUATE_SAFELY_COMPLETE_DAILY_ACTIVITIES: UNABLE TO ASSESS

## 2025-02-11 ASSESSMENT — PAIN SCALES - GENERAL
PAINLEVEL_OUTOF10: 10 - WORST POSSIBLE PAIN
PAINLEVEL_OUTOF10: 8
PAINLEVEL_OUTOF10: 10 - WORST POSSIBLE PAIN
PAINLEVEL_OUTOF10: 5 - MODERATE PAIN
PAINLEVEL_OUTOF10: 2
PAINLEVEL_OUTOF10: 8
PAINLEVEL_OUTOF10: 0 - NO PAIN
PAINLEVEL_OUTOF10: 8

## 2025-02-11 ASSESSMENT — COGNITIVE AND FUNCTIONAL STATUS - GENERAL
STANDING UP FROM CHAIR USING ARMS: A LOT
WALKING IN HOSPITAL ROOM: TOTAL
DRESSING REGULAR UPPER BODY CLOTHING: A LITTLE
PATIENT BASELINE BEDBOUND: NO
TOILETING: A LITTLE
TURNING FROM BACK TO SIDE WHILE IN FLAT BAD: A LITTLE
DAILY ACTIVITIY SCORE: 18
CLIMB 3 TO 5 STEPS WITH RAILING: TOTAL
MOBILITY SCORE: 13
DRESSING REGULAR LOWER BODY CLOTHING: A LOT
MOVING TO AND FROM BED TO CHAIR: A LOT
HELP NEEDED FOR BATHING: A LOT

## 2025-02-11 ASSESSMENT — LIFESTYLE VARIABLES
AUDIT-C TOTAL SCORE: -1
HOW OFTEN DO YOU HAVE 6 OR MORE DRINKS ON ONE OCCASION: PATIENT DECLINED
HOW MANY STANDARD DRINKS CONTAINING ALCOHOL DO YOU HAVE ON A TYPICAL DAY: PATIENT DECLINED
HOW OFTEN DO YOU HAVE A DRINK CONTAINING ALCOHOL: PATIENT DECLINED
SKIP TO QUESTIONS 9-10: 0
AUDIT-C TOTAL SCORE: -1

## 2025-02-11 ASSESSMENT — PAIN - FUNCTIONAL ASSESSMENT
PAIN_FUNCTIONAL_ASSESSMENT: 0-10
PAIN_FUNCTIONAL_ASSESSMENT: FLACC (FACE, LEGS, ACTIVITY, CRY, CONSOLABILITY)
PAIN_FUNCTIONAL_ASSESSMENT: FLACC (FACE, LEGS, ACTIVITY, CRY, CONSOLABILITY)
PAIN_FUNCTIONAL_ASSESSMENT: 0-10

## 2025-02-11 ASSESSMENT — PATIENT HEALTH QUESTIONNAIRE - PHQ9
2. FEELING DOWN, DEPRESSED OR HOPELESS: SEVERAL DAYS
1. LITTLE INTEREST OR PLEASURE IN DOING THINGS: SEVERAL DAYS
SUM OF ALL RESPONSES TO PHQ9 QUESTIONS 1 & 2: 2

## 2025-02-11 ASSESSMENT — PAIN DESCRIPTION - DESCRIPTORS
DESCRIPTORS: ACHING
DESCRIPTORS: ACHING

## 2025-02-11 ASSESSMENT — PAIN DESCRIPTION - PROGRESSION: CLINICAL_PROGRESSION: NOT CHANGED

## 2025-02-11 ASSESSMENT — PAIN DESCRIPTION - ORIENTATION: ORIENTATION: LEFT

## 2025-02-11 ASSESSMENT — PAIN DESCRIPTION - LOCATION: LOCATION: HIP

## 2025-02-11 NOTE — PROGRESS NOTES
Cecelia Chávez is a 84 y.o. female on day 1 of admission presenting with Closed displaced midcervical fracture of left femur.      Subjective   Patient seen in the emergency department boarding.  She is in pain and just received morphine 2 mg IV.  Tearful crying talking to her.       Objective     Last Recorded Vitals  /66   Pulse 78   Temp 37 °C (98.6 °F) (Oral)   Resp 17   Wt (!) 43.1 kg (95 lb)   SpO2 100%   Intake/Output last 3 Shifts:  No intake or output data in the 24 hours ending 02/11/25 0840    Admission Weight  Weight: (!) 43.1 kg (95 lb) (02/10/25 1533)    Daily Weight  02/10/25 : (!) 43.1 kg (95 lb)    Image Results  CT lumbar spine wo IV contrast  Narrative: Interpreted By:  Jono Hendrix,   STUDY:  CT LUMBAR SPINE WO IV CONTRAST;  2/10/2025 7:49 pm      INDICATION:  Signs/Symptoms:fall.      COMPARISON:  None.      ACCESSION NUMBER(S):  [id number]      ORDERING CLINICIAN:  HANK GAY      TECHNIQUE:  Axial noncontrast images of the lumbar spine with coronal and  sagittal reconstructed images.      FINDINGS:  Levocurvature lumbar spine. There is mild-to-moderate osteopenia.      Multilevel moderate to advanced degenerative disc disease worse at  L3-L4 and L4-L5.      No acute fracture or dislocation.      No severe central canal stenosis.      There is dense calcification of the aorta and iliac arteries.  Infrarenal abdominal aorta measures 3.5 cm in diameter.      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          MACRO:  None.      Signed by: Jono Hendrix 2/10/2025 8:38 PM  Dictation workstation:   [id number]  CT thoracic spine wo IV contrast  Narrative: Interpreted By:  Jono Hendrix,   STUDY:  CT THORACIC SPINE WO IV CONTRAST;  2/10/2025 7:44 pm      INDICATION:  Signs/Symptoms:fall on back.      COMPARISON:  None.      ACCESSION NUMBER(S):  [id number]      ORDERING CLINICIAN:  HANK GAY      TECHNIQUE:  Axial noncontrast  images of the thoracic spine with coronal and  sagittal reconstructed images.      FINDINGS:  There is diffuse osteopenia.      Thoracic spine is normally aligned. Vertebral body heights are  maintained. There is multilevel mild to moderate degenerative disc  disease. No acute fracture.      Focal central disc protrusion seen T7-T8.      There is dense calcification of the thoracic aorta.      Impression: Osteopenia without acute fracture or dislocation.          MACRO:  None.      Signed by: Jono Hendrix 2/10/2025 8:33 PM  Dictation workstation:   [id number]  CT cervical spine wo IV contrast  Narrative: Interpreted By:  Jono Hendrix,   STUDY:  CT CERVICAL SPINE WO IV CONTRAST;  2/10/2025 7:35 pm      INDICATION:  Signs/Symptoms:fall.      COMPARISON:  None.      ACCESSION NUMBER(S):  [id number]      ORDERING CLINICIAN:  HANK GAY      TECHNIQUE:  Axial noncontrast images of the cervical spine with coronal and  sagittal reconstructed images.      FINDINGS:  Minor levocurvature cervical spine. Vertebral body heights are  maintained. 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 no acute fracture  or dislocation. Multilevel moderate degenerative disc disease and  facet arthropathy is seen. No severe central canal stenosis.      Impression: No acute fracture or traumatic malalignment of the cervical spine.      MACRO:  None.      Signed by: Jono Hendrix 2/10/2025 8:29 PM  Dictation workstation:   [id number]  CT head wo IV contrast  Narrative: Interpreted By:  Jono Hendrix,   STUDY:  CT HEAD WO IV CONTRAST;  2/10/2025 7:35 pm      INDICATION:  Signs/Symptoms:fall.      COMPARISON:  None.      ACCESSION NUMBER(S):  [id number]      ORDERING CLINICIAN:  HANK GAY      TECHNIQUE:  Noncontrast axial CT images of head were obtained with coronal and  sagittal reconstructed images.      FINDINGS:  BRAIN PARENCHYMA: Moderate cortical atrophy. Moderate chronic  small-vessel ischemic white matter changes seen. No  acute  intraparenchymal hemorrhage or parenchymal evidence of acute large  territory ischemic infarct. No mass-effect. Gray-white matter  distinction is preserved.      VENTRICLES and EXTRA-AXIAL SPACES:  No acute extra-axial or  intraventricular hemorrhage. No effacement of cerebral sulci.  Ventricles and sulci are age-concordant.      PARANASAL SINUSES/MASTOIDS:  No hemorrhage or air-fluid levels within  the visualized paranasal sinuses. The mastoids are well aerated.      CALVARIUM/ORBITS:  No skull fracture. The orbits and globes are  intact to the extent visualized.      EXTRACRANIAL SOFT TISSUES: No discernible abnormality.      Impression: No acute intracranial abnormality.          MACRO:  None.      Signed by: Jono Hendrix 2/10/2025 8:21 PM  Dictation workstation:   [id number]  XR chest 1 view  Narrative: Interpreted By:  Boaz Alvarez,   STUDY:  XR CHEST 1 VIEW;  2/10/2025 4:38 pm      INDICATION:  Signs/Symptoms:fall.          COMPARISON:  None.      ACCESSION NUMBER(S):  [id number]      ORDERING CLINICIAN:  HANK GAY      FINDINGS:  2 separate frontal views are submitted.              CARDIOMEDIASTINAL SILHOUETTE:  There is prominence of the aortic shadow.      LUNGS:  Lucencies left mid chest lateral probably due to overlapping normal  structures including skin folds. No evident consolidative pneumonia,  edema, or effusion.      ABDOMEN:  No remarkable upper abdominal findings.      BONES:  No acute osseous changes.      Impression: 1.  Probably benign lucencies overlying the left mid chest. No  evident acute cardiopulmonary abnormality.  2. Marked prominence of the aortic shadow.              MACRO:  None      Signed by: Boaz Alvarez 2/10/2025 5:09 PM  Dictation workstation:   [id number]  XR hip left with pelvis when performed 2 or 3 views  Narrative: Interpreted By:  Boaz Alvarez,   STUDY:  XR HIP LEFT WITH PELVIS WHEN PERFORMED 2 OR 3 VIEWS; ;  2/10/2025  4:38 pm       INDICATION:  Signs/Symptoms:left hip pain.          COMPARISON:  None.      ACCESSION NUMBER(S):  [id number]      ORDERING CLINICIAN:  HANK GAY      FINDINGS:  Three views demonstrate acute angulated left femoral neck fracture.  There is scattered degenerative changes in arterial vascular  calcifications. There is osseous demineralization.      Impression: Acute angulated left femoral neck fracture.          MACRO:  None      Signed by: Boaz Alvarez 2/10/2025 5:08 PM  Dictation workstation:   [id number]      Physical Exam  Constitutional:       General: She is in acute distress.   HENT:      Right Ear: External ear normal.      Left Ear: External ear normal.      Mouth/Throat:      Mouth: Mucous membranes are moist.   Eyes:      Extraocular Movements: Extraocular movements intact.   Cardiovascular:      Rate and Rhythm: Normal rate.   Pulmonary:      Breath sounds: Normal breath sounds.   Abdominal:      Palpations: Abdomen is soft.   Musculoskeletal:      Right lower leg: No edema.      Left lower leg: No edema.   Skin:     General: Skin is warm.   Neurological:      General: No focal deficit present.      Mental Status: She is alert.   Psychiatric:      Comments: tearful         Relevant Results               Assessment/Plan                  Assessment & Plan  Closed displaced midcervical fracture of left femur  Mechanical fall.  No presyncope  Keep n.p.o. for surgery.  Orthopedic is already aware  Patient is clearly in pain and will increase morphine to 4 mg and give a extra dose currently of 2 mg to make up the difference.  Physical therapy Occupational Therapy after surgery.  Fall, initial encounter                  Kvng Ortiz DO

## 2025-02-11 NOTE — H&P
History Of Present Illness  Cecelia Chávez is a 84 y.o. female presenting with hip fracture..  She tripped and fell and was unable to get up and since arrival in the ER it has been found that she has left hip fracture.  Is feeling fine before this happened.  She denies any syncope or anything else that suddenly made her collapse.       Past Medical History  She has a past medical history of Ankle fracture, right (11/21/2024), Closed fracture of right distal fibula (11/21/2024), Hamstring tendonitis of right thigh (11/21/2024), Heart disease, Hypertension, Injury of right ankle (11/21/2024), and Injury of right foot (11/21/2024).    Surgical History  She has a past surgical history that includes Hysterectomy and Spine surgery.     Social History  She reports that she has been smoking cigarettes. She started smoking about 54 years ago. She has a 13.5 pack-year smoking history. She has never used smokeless tobacco. She reports current alcohol use. She reports that she does not use drugs.    Family History  Family History   Problem Relation Name Age of Onset    Heart disease Son          Allergies  Patient has no known allergies.    Review of Systems see HPI for pertinent positives and negatives otherwise 10 point review of systems negative     Physical Exam alert oriented undistressed  Head atraumatic normocephalic  Pupils equal round reactive light extraocular motion intact  Mouth-normal-appearing tongue and oropharynx  Neck supple without thyromegaly  Lymph nodes no cervical or axillary lymphadenopathy  Heart regular rate and rhythm without murmurs rubs or gallops  Lungs clear to auscultation normal to percussion  Abdomen soft nontender nondistended  Extremities no significant edema  Neuro cranial nerves intact good tone strength in arms and legs  Musculoskeletal normal passive range of motion shoulders elbows did not attempt to move either legs.  Skin both of her feet and toes are scaly and erythematous     Last  Recorded Vitals  BP (!) 121/94   Pulse 92   Temp 37 °C (98.6 °F) (Oral)   Resp 20   Wt (!) 43.1 kg (95 lb)   SpO2 100%     Relevant Results       Assessment/Plan   Assessment & Plan  Closed displaced midcervical fracture of left femur    Fall, initial encounter    Despite her advanced age, she is medically straightforward.  She is medically clear for surgery in the daytime.  Will make her n.p.o. after midnight.  Ordering as needed morphine for pain.  Will order her a single dose of subcu heparin for DVT prophylaxis.  Will order her regular home meds which include gabapentin metoprolol and allopurinol.  Dr. Patel orthopedic surgery is already aware of this patient.         George Nayak MD

## 2025-02-11 NOTE — ASSESSMENT & PLAN NOTE
Mechanical fall.  No presyncope  Keep n.p.o. for surgery.  Orthopedic is already aware  Patient is clearly in pain and will increase morphine to 4 mg and give a extra dose currently of 2 mg to make up the difference.  Physical therapy Occupational Therapy after surgery.

## 2025-02-11 NOTE — PROGRESS NOTES
Pharmacy Medication History Review    Cecelia Chávez is a 84 y.o. female admitted for Closed displaced midcervical fracture of left femur. Pharmacy reviewed the patient's [id number] medications and allergies for accuracy.    Medications ADDED:  Aleve  Multivitamin   Vitamin D3 oral  Aspirin 81mg  Medications CHANGED:  none  Medications REMOVED:   None      The list below reflects the updated PTA list. Comments regarding how patient may be taking medications differently can be found in the Admit Orders Activity  Prior to Admission Medications   Prescriptions Last Dose Informant   allopurinol (Zyloprim) 300 mg tablet Past Week Self   Sig: Take 1 tablet (300 mg) by mouth once daily.   aspirin 81 mg EC tablet Past Week Self   Sig: Take 1 tablet (81 mg) by mouth once daily.   cholecalciferol, vitamin D3, (VITAMIN D3 ORAL) Past Week Self   Sig: Take 1 tablet by mouth once daily.   gabapentin (Neurontin) 300 mg capsule Past Week Self   Sig: Take 1 capsule (300 mg) by mouth once daily.   metoprolol succinate XL (Toprol-XL) 50 mg 24 hr tablet Past Week Self   Sig: Take 1 tablet (50 mg) by mouth once daily. Do not crush or chew.   multivitamin tablet Past Week Self   Sig: Take 1 tablet by mouth once daily.   naproxen sodium (Aleve) 220 mg tablet Unknown Self   Sig: Take 1 tablet (220 mg) by mouth every 12 hours if needed for mild pain (1 - 3).      Facility-Administered Medications: None        The list below reflects the updated allergy list. Please review each documented allergy for additional clarification and justification.  Allergies  Reviewed by Sylvia Marinelli CPhT on 2/11/2025   No Known Allergies         Pharmacy has been updated to [facility].    Sources used to complete the med history include dispense history, PTA medication list, patient interview. Patient is a fair historian.    Below are additional concerns with the patient's PTA list.  Patient reports stopping and stopping medications. She  reports being unsure what medications are for and needing to talk to her PCP about them. Patient unsure of last doses.    Sylvia Marinelli, CPhT-Adv  Please reach out via [employer] Secure Chat for questions

## 2025-02-11 NOTE — CONSULTS
"Reason For Consult  Left hip fracture    History Of Present Illness  Cecelia Chávez is a 84 y.o. female presenting with a fall yesterday injuring her left hip.  Notes that her left side is weak due to some sounds like spinal related issues and her left leg gave out on her falling and she injured her left hip.  Notes that the left hip remains fairly painful no other significant injury sustained denies any new numbness or ting in the left leg although she has some baseline.     Past Medical History  She has a past medical history of Ankle fracture, right (11/21/2024), Closed fracture of right distal fibula (11/21/2024), Hamstring tendonitis of right thigh (11/21/2024), Heart disease, Hypertension, Injury of right ankle (11/21/2024), and Injury of right foot (11/21/2024).    Surgical History  She has a past surgical history that includes Hysterectomy and Spine surgery.     Social History  She reports that she has been smoking cigarettes. She started smoking about 54 years ago. She has a 13.5 pack-year smoking history. She has never used smokeless tobacco. She reports current alcohol use. She reports that she does not use drugs.    Family History  Family History   Problem Relation Name Age of Onset    Heart disease Son          Allergies  Patient has no known allergies.    Review of Systems  Notes pain about her left hip     Physical Exam  Left leg shortened and somewhat externally rotated tenderness palpation over the hip and groin area pain with attempted hip range of motion no calf pain tenderness or swelling positive EHL FHL sensation intact light touch and brisk cap refill to the toes     Last Recorded Vitals  Blood pressure 116/66, pulse 78, temperature 37 °C (98.6 °F), temperature source Oral, resp. rate 17, height 1.575 m (5' 2\"), weight (!) 43.1 kg (95 lb), SpO2 100%.    Relevant Results  X-rays demonstrate displaced left femoral neck fracture     Assessment/Plan     84-year-old female with a left hip femoral " neck fracture.  Discussed with the patient this is some that would require surgery for hemiarthroplasty.  The OR's are unable to accommodate today so we will plan for tomorrow for hip hemiarthroplasty with likely one of my partners Dr. Ellsworth or Dr. Patel.  N.p.o. after midnight.    I spent 30 minutes in the professional and overall care of this patient.      Luca Ramon MD

## 2025-02-12 ENCOUNTER — APPOINTMENT (OUTPATIENT)
Dept: RADIOLOGY | Facility: HOSPITAL | Age: 85
DRG: 521 | End: 2025-02-12
Payer: MEDICARE

## 2025-02-12 ENCOUNTER — ANESTHESIA EVENT (OUTPATIENT)
Dept: OPERATING ROOM | Facility: HOSPITAL | Age: 85
End: 2025-02-12
Payer: MEDICARE

## 2025-02-12 ENCOUNTER — ANESTHESIA (OUTPATIENT)
Dept: OPERATING ROOM | Facility: HOSPITAL | Age: 85
End: 2025-02-12
Payer: MEDICARE

## 2025-02-12 PROBLEM — R41.0 DELIRIUM: Status: ACTIVE | Noted: 2025-02-12

## 2025-02-12 PROBLEM — D64.9 ANEMIA: Status: ACTIVE | Noted: 2025-02-12

## 2025-02-12 LAB
ALBUMIN SERPL BCP-MCNC: 3.2 G/DL (ref 3.4–5)
ANION GAP SERPL CALCULATED.3IONS-SCNC: 12 MMOL/L (ref 10–20)
BASOPHILS # BLD AUTO: 0.03 X10*3/UL (ref 0–0.1)
BASOPHILS NFR BLD AUTO: 0.4 %
BUN SERPL-MCNC: 18 MG/DL (ref 6–23)
CALCIUM SERPL-MCNC: 8.5 MG/DL (ref 8.6–10.3)
CHLORIDE SERPL-SCNC: 110 MMOL/L (ref 98–107)
CO2 SERPL-SCNC: 25 MMOL/L (ref 21–32)
CREAT SERPL-MCNC: 0.89 MG/DL (ref 0.5–1.05)
EGFRCR SERPLBLD CKD-EPI 2021: 65 ML/MIN/1.73M*2
EOSINOPHIL # BLD AUTO: 0.02 X10*3/UL (ref 0–0.4)
EOSINOPHIL NFR BLD AUTO: 0.3 %
ERYTHROCYTE [DISTWIDTH] IN BLOOD BY AUTOMATED COUNT: 13.1 % (ref 11.5–14.5)
GLUCOSE SERPL-MCNC: 116 MG/DL (ref 74–99)
HCT VFR BLD AUTO: 31.6 % (ref 36–46)
HGB BLD-MCNC: 10 G/DL (ref 12–16)
IMM GRANULOCYTES # BLD AUTO: 0.02 X10*3/UL (ref 0–0.5)
IMM GRANULOCYTES NFR BLD AUTO: 0.3 % (ref 0–0.9)
LYMPHOCYTES # BLD AUTO: 0.58 X10*3/UL (ref 0.8–3)
LYMPHOCYTES NFR BLD AUTO: 7.8 %
MCH RBC QN AUTO: 32.5 PG (ref 26–34)
MCHC RBC AUTO-ENTMCNC: 31.6 G/DL (ref 32–36)
MCV RBC AUTO: 103 FL (ref 80–100)
MONOCYTES # BLD AUTO: 0.73 X10*3/UL (ref 0.05–0.8)
MONOCYTES NFR BLD AUTO: 9.8 %
NEUTROPHILS # BLD AUTO: 6.07 X10*3/UL (ref 1.6–5.5)
NEUTROPHILS NFR BLD AUTO: 81.4 %
NRBC BLD-RTO: 0 /100 WBCS (ref 0–0)
PHOSPHATE SERPL-MCNC: 3.3 MG/DL (ref 2.5–4.9)
PLATELET # BLD AUTO: 159 X10*3/UL (ref 150–450)
POTASSIUM SERPL-SCNC: 3.5 MMOL/L (ref 3.5–5.3)
RBC # BLD AUTO: 3.08 X10*6/UL (ref 4–5.2)
SODIUM SERPL-SCNC: 143 MMOL/L (ref 136–145)
WBC # BLD AUTO: 7.5 X10*3/UL (ref 4.4–11.3)

## 2025-02-12 PROCEDURE — 2500000004 HC RX 250 GENERAL PHARMACY W/ HCPCS (ALT 636 FOR OP/ED): Performed by: ORTHOPAEDIC SURGERY

## 2025-02-12 PROCEDURE — 84132 ASSAY OF SERUM POTASSIUM: CPT | Performed by: INTERNAL MEDICINE

## 2025-02-12 PROCEDURE — A27236 PR FEMORAL FX, OPEN TX: Performed by: ANESTHESIOLOGY

## 2025-02-12 PROCEDURE — 2500000004 HC RX 250 GENERAL PHARMACY W/ HCPCS (ALT 636 FOR OP/ED): Performed by: INTERNAL MEDICINE

## 2025-02-12 PROCEDURE — 3600000010 HC OR TIME - EACH INCREMENTAL 1 MINUTE - PROCEDURE LEVEL FIVE: Performed by: ORTHOPAEDIC SURGERY

## 2025-02-12 PROCEDURE — A27236 PR FEMORAL FX, OPEN TX

## 2025-02-12 PROCEDURE — A4649 SURGICAL SUPPLIES: HCPCS | Performed by: ORTHOPAEDIC SURGERY

## 2025-02-12 PROCEDURE — 2500000005 HC RX 250 GENERAL PHARMACY W/O HCPCS

## 2025-02-12 PROCEDURE — 2780000003 HC OR 278 NO HCPCS: Performed by: ORTHOPAEDIC SURGERY

## 2025-02-12 PROCEDURE — 7100000001 HC RECOVERY ROOM TIME - INITIAL BASE CHARGE: Performed by: ORTHOPAEDIC SURGERY

## 2025-02-12 PROCEDURE — 72170 X-RAY EXAM OF PELVIS: CPT | Performed by: RADIOLOGY

## 2025-02-12 PROCEDURE — 99100 ANES PT EXTEME AGE<1 YR&>70: CPT | Performed by: ANESTHESIOLOGY

## 2025-02-12 PROCEDURE — 1100000001 HC PRIVATE ROOM DAILY

## 2025-02-12 PROCEDURE — 2500000004 HC RX 250 GENERAL PHARMACY W/ HCPCS (ALT 636 FOR OP/ED): Performed by: ANESTHESIOLOGY

## 2025-02-12 PROCEDURE — 85025 COMPLETE CBC W/AUTO DIFF WBC: CPT | Performed by: INTERNAL MEDICINE

## 2025-02-12 PROCEDURE — 2500000001 HC RX 250 WO HCPCS SELF ADMINISTERED DRUGS (ALT 637 FOR MEDICARE OP): Performed by: INTERNAL MEDICINE

## 2025-02-12 PROCEDURE — 36415 COLL VENOUS BLD VENIPUNCTURE: CPT | Performed by: INTERNAL MEDICINE

## 2025-02-12 PROCEDURE — 2720000007 HC OR 272 NO HCPCS: Performed by: ORTHOPAEDIC SURGERY

## 2025-02-12 PROCEDURE — 27125 PARTIAL HIP REPLACEMENT: CPT | Performed by: PHYSICIAN ASSISTANT

## 2025-02-12 PROCEDURE — 3700000001 HC GENERAL ANESTHESIA TIME - INITIAL BASE CHARGE: Performed by: ORTHOPAEDIC SURGERY

## 2025-02-12 PROCEDURE — 3600000005 HC OR TIME - INITIAL BASE CHARGE - PROCEDURE LEVEL FIVE: Performed by: ORTHOPAEDIC SURGERY

## 2025-02-12 PROCEDURE — 72170 X-RAY EXAM OF PELVIS: CPT

## 2025-02-12 PROCEDURE — 3700000002 HC GENERAL ANESTHESIA TIME - EACH INCREMENTAL 1 MINUTE: Performed by: ORTHOPAEDIC SURGERY

## 2025-02-12 PROCEDURE — 2500000001 HC RX 250 WO HCPCS SELF ADMINISTERED DRUGS (ALT 637 FOR MEDICARE OP): Performed by: ORTHOPAEDIC SURGERY

## 2025-02-12 PROCEDURE — C1776 JOINT DEVICE (IMPLANTABLE): HCPCS | Performed by: ORTHOPAEDIC SURGERY

## 2025-02-12 PROCEDURE — 2500000005 HC RX 250 GENERAL PHARMACY W/O HCPCS: Performed by: INTERNAL MEDICINE

## 2025-02-12 PROCEDURE — 0SRS0JA REPLACEMENT OF LEFT HIP JOINT, FEMORAL SURFACE WITH SYNTHETIC SUBSTITUTE, UNCEMENTED, OPEN APPROACH: ICD-10-PCS | Performed by: ORTHOPAEDIC SURGERY

## 2025-02-12 PROCEDURE — 99232 SBSQ HOSP IP/OBS MODERATE 35: CPT | Performed by: INTERNAL MEDICINE

## 2025-02-12 PROCEDURE — 2500000004 HC RX 250 GENERAL PHARMACY W/ HCPCS (ALT 636 FOR OP/ED)

## 2025-02-12 PROCEDURE — 7100000002 HC RECOVERY ROOM TIME - EACH INCREMENTAL 1 MINUTE: Performed by: ORTHOPAEDIC SURGERY

## 2025-02-12 DEVICE — ARTICUL/EZE FEMORAL HEAD DIAMETER 28MM +1.5 12/14 TAPER
Type: IMPLANTABLE DEVICE | Site: HIP | Status: FUNCTIONAL
Brand: ARTICUL/EZE

## 2025-02-12 DEVICE — CORAIL HIP SYSTEM CEMENTLESS FEMORAL STEM 12/14 AMT 135 DEGREES KA SIZE 12 HA COATED STANDARD COLLAR
Type: IMPLANTABLE DEVICE | Site: HIP | Status: FUNCTIONAL
Brand: CORAIL

## 2025-02-12 DEVICE — SELF CENTERING BI-POLAR HEAD 28MM ID 45MM OD
Type: IMPLANTABLE DEVICE | Site: HIP | Status: FUNCTIONAL
Brand: SELF CENTERING

## 2025-02-12 RX ORDER — ALBUTEROL SULFATE 0.83 MG/ML
2.5 SOLUTION RESPIRATORY (INHALATION) ONCE AS NEEDED
Status: DISCONTINUED | OUTPATIENT
Start: 2025-02-12 | End: 2025-02-12 | Stop reason: HOSPADM

## 2025-02-12 RX ORDER — KETOROLAC TROMETHAMINE 30 MG/ML
INJECTION, SOLUTION INTRAMUSCULAR; INTRAVENOUS AS NEEDED
Status: DISCONTINUED | OUTPATIENT
Start: 2025-02-12 | End: 2025-02-12

## 2025-02-12 RX ORDER — CEFAZOLIN SODIUM 2 G/100ML
2 INJECTION, SOLUTION INTRAVENOUS EVERY 8 HOURS
Status: COMPLETED | OUTPATIENT
Start: 2025-02-12 | End: 2025-02-13

## 2025-02-12 RX ORDER — ONDANSETRON HYDROCHLORIDE 2 MG/ML
INJECTION, SOLUTION INTRAVENOUS AS NEEDED
Status: DISCONTINUED | OUTPATIENT
Start: 2025-02-12 | End: 2025-02-12

## 2025-02-12 RX ORDER — FENTANYL CITRATE 50 UG/ML
INJECTION, SOLUTION INTRAMUSCULAR; INTRAVENOUS AS NEEDED
Status: DISCONTINUED | OUTPATIENT
Start: 2025-02-12 | End: 2025-02-12

## 2025-02-12 RX ORDER — PHENYLEPHRINE HCL IN 0.9% NACL 1 MG/10 ML
SYRINGE (ML) INTRAVENOUS AS NEEDED
Status: DISCONTINUED | OUTPATIENT
Start: 2025-02-12 | End: 2025-02-12

## 2025-02-12 RX ORDER — ONDANSETRON HYDROCHLORIDE 2 MG/ML
4 INJECTION, SOLUTION INTRAVENOUS ONCE AS NEEDED
Status: DISCONTINUED | OUTPATIENT
Start: 2025-02-12 | End: 2025-02-12 | Stop reason: HOSPADM

## 2025-02-12 RX ORDER — ACETAMINOPHEN 325 MG/1
975 TABLET ORAL EVERY 8 HOURS
Status: DISCONTINUED | OUTPATIENT
Start: 2025-02-13 | End: 2025-02-17 | Stop reason: HOSPADM

## 2025-02-12 RX ORDER — DOCUSATE SODIUM 100 MG/1
100 CAPSULE, LIQUID FILLED ORAL 2 TIMES DAILY
Status: DISCONTINUED | OUTPATIENT
Start: 2025-02-12 | End: 2025-02-17 | Stop reason: HOSPADM

## 2025-02-12 RX ORDER — GLYCOPYRROLATE 0.2 MG/ML
INJECTION INTRAMUSCULAR; INTRAVENOUS AS NEEDED
Status: DISCONTINUED | OUTPATIENT
Start: 2025-02-12 | End: 2025-02-12

## 2025-02-12 RX ORDER — ROCURONIUM BROMIDE 10 MG/ML
INJECTION, SOLUTION INTRAVENOUS AS NEEDED
Status: DISCONTINUED | OUTPATIENT
Start: 2025-02-12 | End: 2025-02-12

## 2025-02-12 RX ORDER — VANCOMYCIN HYDROCHLORIDE 1 G/20ML
INJECTION, POWDER, LYOPHILIZED, FOR SOLUTION INTRAVENOUS AS NEEDED
Status: DISCONTINUED | OUTPATIENT
Start: 2025-02-12 | End: 2025-02-12 | Stop reason: HOSPADM

## 2025-02-12 RX ORDER — LIDOCAINE HYDROCHLORIDE 10 MG/ML
INJECTION, SOLUTION INFILTRATION; PERINEURAL AS NEEDED
Status: DISCONTINUED | OUTPATIENT
Start: 2025-02-12 | End: 2025-02-12

## 2025-02-12 RX ORDER — SODIUM CHLORIDE 9 MG/ML
80 INJECTION, SOLUTION INTRAVENOUS CONTINUOUS
Status: ACTIVE | OUTPATIENT
Start: 2025-02-12 | End: 2025-02-13

## 2025-02-12 RX ORDER — MEPERIDINE HYDROCHLORIDE 25 MG/ML
12.5 INJECTION INTRAMUSCULAR; INTRAVENOUS; SUBCUTANEOUS EVERY 10 MIN PRN
Status: DISCONTINUED | OUTPATIENT
Start: 2025-02-12 | End: 2025-02-12 | Stop reason: HOSPADM

## 2025-02-12 RX ORDER — FENTANYL CITRATE 50 UG/ML
50 INJECTION, SOLUTION INTRAMUSCULAR; INTRAVENOUS EVERY 5 MIN PRN
Status: DISCONTINUED | OUTPATIENT
Start: 2025-02-12 | End: 2025-02-12 | Stop reason: HOSPADM

## 2025-02-12 RX ORDER — HYDRALAZINE HYDROCHLORIDE 20 MG/ML
5 INJECTION INTRAMUSCULAR; INTRAVENOUS EVERY 30 MIN PRN
Status: DISCONTINUED | OUTPATIENT
Start: 2025-02-12 | End: 2025-02-12 | Stop reason: HOSPADM

## 2025-02-12 RX ORDER — NALOXONE HYDROCHLORIDE 0.4 MG/ML
0.2 INJECTION, SOLUTION INTRAMUSCULAR; INTRAVENOUS; SUBCUTANEOUS EVERY 5 MIN PRN
Status: DISCONTINUED | OUTPATIENT
Start: 2025-02-12 | End: 2025-02-17 | Stop reason: HOSPADM

## 2025-02-12 RX ORDER — HYDROCODONE BITARTRATE AND ACETAMINOPHEN 5; 325 MG/1; MG/1
1 TABLET ORAL EVERY 4 HOURS PRN
Status: DISCONTINUED | OUTPATIENT
Start: 2025-02-12 | End: 2025-02-17 | Stop reason: HOSPADM

## 2025-02-12 RX ORDER — SODIUM CHLORIDE, SODIUM LACTATE, POTASSIUM CHLORIDE, CALCIUM CHLORIDE 600; 310; 30; 20 MG/100ML; MG/100ML; MG/100ML; MG/100ML
100 INJECTION, SOLUTION INTRAVENOUS CONTINUOUS
Status: DISCONTINUED | OUTPATIENT
Start: 2025-02-12 | End: 2025-02-12 | Stop reason: HOSPADM

## 2025-02-12 RX ORDER — ACETAMINOPHEN 325 MG/1
650 TABLET ORAL EVERY 6 HOURS SCHEDULED
Status: DISCONTINUED | OUTPATIENT
Start: 2025-02-12 | End: 2025-02-12

## 2025-02-12 RX ORDER — CEFAZOLIN SODIUM 2 G/100ML
INJECTION, SOLUTION INTRAVENOUS AS NEEDED
Status: DISCONTINUED | OUTPATIENT
Start: 2025-02-12 | End: 2025-02-12

## 2025-02-12 RX ORDER — POLYETHYLENE GLYCOL 3350 17 G/17G
17 POWDER, FOR SOLUTION ORAL DAILY
Status: DISCONTINUED | OUTPATIENT
Start: 2025-02-12 | End: 2025-02-17 | Stop reason: HOSPADM

## 2025-02-12 RX ORDER — TRANEXAMIC ACID 100 MG/ML
INJECTION, SOLUTION INTRAVENOUS AS NEEDED
Status: DISCONTINUED | OUTPATIENT
Start: 2025-02-12 | End: 2025-02-12

## 2025-02-12 RX ORDER — ASPIRIN 325 MG
325 TABLET, DELAYED RELEASE (ENTERIC COATED) ORAL 2 TIMES DAILY
Status: DISCONTINUED | OUTPATIENT
Start: 2025-02-12 | End: 2025-02-17 | Stop reason: HOSPADM

## 2025-02-12 RX ORDER — FENTANYL CITRATE 50 UG/ML
50 INJECTION, SOLUTION INTRAMUSCULAR; INTRAVENOUS ONCE
Status: COMPLETED | OUTPATIENT
Start: 2025-02-12 | End: 2025-02-12

## 2025-02-12 RX ORDER — MIDAZOLAM HYDROCHLORIDE 1 MG/ML
1 INJECTION, SOLUTION INTRAMUSCULAR; INTRAVENOUS ONCE AS NEEDED
Status: DISCONTINUED | OUTPATIENT
Start: 2025-02-12 | End: 2025-02-12 | Stop reason: HOSPADM

## 2025-02-12 RX ORDER — LIDOCAINE 560 MG/1
1 PATCH PERCUTANEOUS; TOPICAL; TRANSDERMAL EVERY 24 HOURS
Status: DISCONTINUED | OUTPATIENT
Start: 2025-02-12 | End: 2025-02-17 | Stop reason: HOSPADM

## 2025-02-12 RX ORDER — ACETAMINOPHEN 500 MG
5 TABLET ORAL DAILY
Status: DISCONTINUED | OUTPATIENT
Start: 2025-02-12 | End: 2025-02-13

## 2025-02-12 RX ORDER — PROPOFOL 10 MG/ML
INJECTION, EMULSION INTRAVENOUS AS NEEDED
Status: DISCONTINUED | OUTPATIENT
Start: 2025-02-12 | End: 2025-02-12

## 2025-02-12 RX ORDER — LIDOCAINE HYDROCHLORIDE 10 MG/ML
0.1 INJECTION, SOLUTION INFILTRATION; PERINEURAL ONCE
Status: DISCONTINUED | OUTPATIENT
Start: 2025-02-12 | End: 2025-02-12 | Stop reason: HOSPADM

## 2025-02-12 RX ADMIN — LIDOCAINE 1 PATCH: 4 PATCH TOPICAL at 21:40

## 2025-02-12 RX ADMIN — HYDROMORPHONE HYDROCHLORIDE 0.4 MG: 1 INJECTION, SOLUTION INTRAMUSCULAR; INTRAVENOUS; SUBCUTANEOUS at 15:53

## 2025-02-12 RX ADMIN — POLYETHYLENE GLYCOL 3350 17 G: 17 POWDER, FOR SOLUTION ORAL at 16:39

## 2025-02-12 RX ADMIN — SODIUM CHLORIDE, POTASSIUM CHLORIDE, SODIUM LACTATE AND CALCIUM CHLORIDE: 600; 310; 30; 20 INJECTION, SOLUTION INTRAVENOUS at 15:01

## 2025-02-12 RX ADMIN — GLYCOPYRROLATE 0.2 MG: 0.2 INJECTION INTRAMUSCULAR; INTRAVENOUS at 14:12

## 2025-02-12 RX ADMIN — FENTANYL CITRATE 25 MCG: 0.05 INJECTION, SOLUTION INTRAMUSCULAR; INTRAVENOUS at 15:04

## 2025-02-12 RX ADMIN — FENTANYL CITRATE 50 MCG: 0.05 INJECTION, SOLUTION INTRAMUSCULAR; INTRAVENOUS at 13:14

## 2025-02-12 RX ADMIN — Medication 200 MCG: at 13:57

## 2025-02-12 RX ADMIN — PROPOFOL 50 MG: 10 INJECTION, EMULSION INTRAVENOUS at 13:15

## 2025-02-12 RX ADMIN — SODIUM CHLORIDE, POTASSIUM CHLORIDE, SODIUM LACTATE AND CALCIUM CHLORIDE: 600; 310; 30; 20 INJECTION, SOLUTION INTRAVENOUS at 13:14

## 2025-02-12 RX ADMIN — ROCURONIUM BROMIDE 45 MG: 10 INJECTION, SOLUTION INTRAVENOUS at 13:17

## 2025-02-12 RX ADMIN — MORPHINE SULFATE 4 MG: 4 INJECTION, SOLUTION INTRAMUSCULAR; INTRAVENOUS at 02:55

## 2025-02-12 RX ADMIN — CEFAZOLIN SODIUM 2 G: 2 INJECTION, SOLUTION INTRAVENOUS at 13:25

## 2025-02-12 RX ADMIN — LIDOCAINE HYDROCHLORIDE 50 MG: 10 INJECTION, SOLUTION INFILTRATION; PERINEURAL at 13:14

## 2025-02-12 RX ADMIN — FENTANYL CITRATE 50 MCG: 0.05 INJECTION, SOLUTION INTRAMUSCULAR; INTRAVENOUS at 13:52

## 2025-02-12 RX ADMIN — Medication 100 MCG: at 14:22

## 2025-02-12 RX ADMIN — FENTANYL CITRATE 25 MCG: 0.05 INJECTION, SOLUTION INTRAMUSCULAR; INTRAVENOUS at 14:51

## 2025-02-12 RX ADMIN — KETOROLAC TROMETHAMINE 15 MG: 30 INJECTION, SOLUTION INTRAMUSCULAR at 14:45

## 2025-02-12 RX ADMIN — ASPIRIN 325 MG: 325 TABLET, COATED ORAL at 21:39

## 2025-02-12 RX ADMIN — SODIUM CHLORIDE 80 ML/HR: 900 INJECTION, SOLUTION INTRAVENOUS at 16:36

## 2025-02-12 RX ADMIN — Medication 100 MCG: at 14:28

## 2025-02-12 RX ADMIN — Medication 200 MCG: at 14:12

## 2025-02-12 RX ADMIN — FENTANYL CITRATE 50 MCG: 0.05 INJECTION, SOLUTION INTRAMUSCULAR; INTRAVENOUS at 12:31

## 2025-02-12 RX ADMIN — CEFAZOLIN SODIUM 2 G: 2 INJECTION, SOLUTION INTRAVENOUS at 21:39

## 2025-02-12 RX ADMIN — DEXAMETHASONE SODIUM PHOSPHATE 4 MG: 4 INJECTION, SOLUTION INTRAMUSCULAR; INTRAVENOUS at 13:55

## 2025-02-12 RX ADMIN — ONDANSETRON HYDROCHLORIDE 4 MG: 2 INJECTION INTRAMUSCULAR; INTRAVENOUS at 14:33

## 2025-02-12 RX ADMIN — DOCUSATE SODIUM 100 MG: 100 CAPSULE, LIQUID FILLED ORAL at 21:39

## 2025-02-12 RX ADMIN — PROPOFOL 30 MG: 10 INJECTION, EMULSION INTRAVENOUS at 13:52

## 2025-02-12 RX ADMIN — PROPOFOL 30 MG: 10 INJECTION, EMULSION INTRAVENOUS at 13:16

## 2025-02-12 RX ADMIN — ACETAMINOPHEN 650 MG: 325 TABLET, FILM COATED ORAL at 16:40

## 2025-02-12 RX ADMIN — Medication 100 MCG: at 14:04

## 2025-02-12 RX ADMIN — Medication 5 MG: at 21:39

## 2025-02-12 RX ADMIN — SUGAMMADEX 200 MG: 100 INJECTION, SOLUTION INTRAVENOUS at 14:51

## 2025-02-12 RX ADMIN — TRANEXAMIC ACID 1000 MG: 100 INJECTION, SOLUTION INTRAVENOUS at 13:25

## 2025-02-12 SDOH — ECONOMIC STABILITY: INCOME INSECURITY: IN THE PAST 12 MONTHS HAS THE ELECTRIC, GAS, OIL, OR WATER COMPANY THREATENED TO SHUT OFF SERVICES IN YOUR HOME?: NO

## 2025-02-12 SDOH — HEALTH STABILITY: MENTAL HEALTH
DO YOU FEEL STRESS - TENSE, RESTLESS, NERVOUS, OR ANXIOUS, OR UNABLE TO SLEEP AT NIGHT BECAUSE YOUR MIND IS TROUBLED ALL THE TIME - THESE DAYS?: ONLY A LITTLE

## 2025-02-12 SDOH — SOCIAL STABILITY: SOCIAL NETWORK: HOW OFTEN DO YOU ATTEND MEETINGS OF THE CLUBS OR ORGANIZATIONS YOU BELONG TO?: NEVER

## 2025-02-12 SDOH — HEALTH STABILITY: PHYSICAL HEALTH
HOW OFTEN DO YOU NEED TO HAVE SOMEONE HELP YOU WHEN YOU READ INSTRUCTIONS, PAMPHLETS, OR OTHER WRITTEN MATERIAL FROM YOUR DOCTOR OR PHARMACY?: NEVER

## 2025-02-12 SDOH — ECONOMIC STABILITY: HOUSING INSECURITY: AT ANY TIME IN THE PAST 12 MONTHS, WERE YOU HOMELESS OR LIVING IN A SHELTER (INCLUDING NOW)?: NO

## 2025-02-12 SDOH — SOCIAL STABILITY: SOCIAL INSECURITY
WITHIN THE LAST YEAR, HAVE YOU BEEN KICKED, HIT, SLAPPED, OR OTHERWISE PHYSICALLY HURT BY YOUR PARTNER OR EX-PARTNER?: NO

## 2025-02-12 SDOH — SOCIAL STABILITY: SOCIAL INSECURITY: WITHIN THE LAST YEAR, HAVE YOU BEEN HUMILIATED OR EMOTIONALLY ABUSED IN OTHER WAYS BY YOUR PARTNER OR EX-PARTNER?: NO

## 2025-02-12 SDOH — SOCIAL STABILITY: SOCIAL NETWORK
DO YOU BELONG TO ANY CLUBS OR ORGANIZATIONS SUCH AS CHURCH GROUPS, UNIONS, FRATERNAL OR ATHLETIC GROUPS, OR SCHOOL GROUPS?: NO

## 2025-02-12 SDOH — HEALTH STABILITY: MENTAL HEALTH: HOW OFTEN DO YOU HAVE SIX OR MORE DRINKS ON ONE OCCASION?: NEVER

## 2025-02-12 SDOH — ECONOMIC STABILITY: FOOD INSECURITY: WITHIN THE PAST 12 MONTHS, THE FOOD YOU BOUGHT JUST DIDN'T LAST AND YOU DIDN'T HAVE MONEY TO GET MORE.: NEVER TRUE

## 2025-02-12 SDOH — HEALTH STABILITY: MENTAL HEALTH: HOW OFTEN DO YOU HAVE A DRINK CONTAINING ALCOHOL?: NEVER

## 2025-02-12 SDOH — ECONOMIC STABILITY: HOUSING INSECURITY: IN THE LAST 12 MONTHS, WAS THERE A TIME WHEN YOU WERE NOT ABLE TO PAY THE MORTGAGE OR RENT ON TIME?: NO

## 2025-02-12 SDOH — ECONOMIC STABILITY: FOOD INSECURITY: WITHIN THE PAST 12 MONTHS, YOU WORRIED THAT YOUR FOOD WOULD RUN OUT BEFORE YOU GOT THE MONEY TO BUY MORE.: NEVER TRUE

## 2025-02-12 SDOH — SOCIAL STABILITY: SOCIAL INSECURITY
WITHIN THE LAST YEAR, HAVE YOU BEEN RAPED OR FORCED TO HAVE ANY KIND OF SEXUAL ACTIVITY BY YOUR PARTNER OR EX-PARTNER?: NO

## 2025-02-12 SDOH — ECONOMIC STABILITY: FOOD INSECURITY: HOW HARD IS IT FOR YOU TO PAY FOR THE VERY BASICS LIKE FOOD, HOUSING, MEDICAL CARE, AND HEATING?: NOT HARD AT ALL

## 2025-02-12 SDOH — SOCIAL STABILITY: SOCIAL INSECURITY: WITHIN THE LAST YEAR, HAVE YOU BEEN AFRAID OF YOUR PARTNER OR EX-PARTNER?: NO

## 2025-02-12 SDOH — SOCIAL STABILITY: SOCIAL NETWORK: HOW OFTEN DO YOU GET TOGETHER WITH FRIENDS OR RELATIVES?: MORE THAN THREE TIMES A WEEK

## 2025-02-12 SDOH — ECONOMIC STABILITY: HOUSING INSECURITY: IN THE PAST 12 MONTHS, HOW MANY TIMES HAVE YOU MOVED WHERE YOU WERE LIVING?: 1

## 2025-02-12 SDOH — HEALTH STABILITY: MENTAL HEALTH: HOW MANY DRINKS CONTAINING ALCOHOL DO YOU HAVE ON A TYPICAL DAY WHEN YOU ARE DRINKING?: PATIENT DOES NOT DRINK

## 2025-02-12 SDOH — HEALTH STABILITY: PHYSICAL HEALTH: ON AVERAGE, HOW MANY DAYS PER WEEK DO YOU ENGAGE IN MODERATE TO STRENUOUS EXERCISE (LIKE A BRISK WALK)?: 5 DAYS

## 2025-02-12 SDOH — ECONOMIC STABILITY: TRANSPORTATION INSECURITY: IN THE PAST 12 MONTHS, HAS LACK OF TRANSPORTATION KEPT YOU FROM MEDICAL APPOINTMENTS OR FROM GETTING MEDICATIONS?: NO

## 2025-02-12 SDOH — HEALTH STABILITY: PHYSICAL HEALTH: ON AVERAGE, HOW MANY MINUTES DO YOU ENGAGE IN EXERCISE AT THIS LEVEL?: 10 MIN

## 2025-02-12 SDOH — SOCIAL STABILITY: SOCIAL NETWORK: HOW OFTEN DO YOU ATTEND CHURCH OR RELIGIOUS SERVICES?: NEVER

## 2025-02-12 SDOH — SOCIAL STABILITY: SOCIAL NETWORK
IN A TYPICAL WEEK, HOW MANY TIMES DO YOU TALK ON THE PHONE WITH FAMILY, FRIENDS, OR NEIGHBORS?: MORE THAN THREE TIMES A WEEK

## 2025-02-12 SDOH — HEALTH STABILITY: MENTAL HEALTH: CURRENT SMOKER: 0

## 2025-02-12 ASSESSMENT — PAIN SCALES - GENERAL
PAINLEVEL_OUTOF10: 7
PAINLEVEL_OUTOF10: 0 - NO PAIN
PAINLEVEL_OUTOF10: 0 - NO PAIN
PAINLEVEL_OUTOF10: 8
PAINLEVEL_OUTOF10: 0 - NO PAIN
PAIN_LEVEL: 2
PAINLEVEL_OUTOF10: 10 - WORST POSSIBLE PAIN
PAINLEVEL_OUTOF10: 0 - NO PAIN

## 2025-02-12 ASSESSMENT — LIFESTYLE VARIABLES
AUDIT-C TOTAL SCORE: 0
SKIP TO QUESTIONS 9-10: 1

## 2025-02-12 ASSESSMENT — ACTIVITIES OF DAILY LIVING (ADL)
LACK_OF_TRANSPORTATION: NO
LACK_OF_TRANSPORTATION: NO

## 2025-02-12 ASSESSMENT — COGNITIVE AND FUNCTIONAL STATUS - GENERAL
TOILETING: A LITTLE
MOVING TO AND FROM BED TO CHAIR: A LOT
TURNING FROM BACK TO SIDE WHILE IN FLAT BAD: A LITTLE
MOBILITY SCORE: 13
DRESSING REGULAR UPPER BODY CLOTHING: A LITTLE
MOVING FROM LYING ON BACK TO SITTING ON SIDE OF FLAT BED WITH BEDRAILS: A LITTLE
WALKING IN HOSPITAL ROOM: TOTAL
TURNING FROM BACK TO SIDE WHILE IN FLAT BAD: A LITTLE
CLIMB 3 TO 5 STEPS WITH RAILING: TOTAL
DAILY ACTIVITIY SCORE: 18
CLIMB 3 TO 5 STEPS WITH RAILING: TOTAL
HELP NEEDED FOR BATHING: A LOT
STANDING UP FROM CHAIR USING ARMS: A LOT
DAILY ACTIVITIY SCORE: 18
DRESSING REGULAR LOWER BODY CLOTHING: A LOT
DRESSING REGULAR UPPER BODY CLOTHING: A LITTLE
MOBILITY SCORE: 12
WALKING IN HOSPITAL ROOM: TOTAL
DRESSING REGULAR LOWER BODY CLOTHING: A LOT
STANDING UP FROM CHAIR USING ARMS: A LOT
MOVING TO AND FROM BED TO CHAIR: A LOT
TOILETING: A LITTLE
HELP NEEDED FOR BATHING: A LOT

## 2025-02-12 ASSESSMENT — PAIN - FUNCTIONAL ASSESSMENT
PAIN_FUNCTIONAL_ASSESSMENT: FLACC (FACE, LEGS, ACTIVITY, CRY, CONSOLABILITY)
PAIN_FUNCTIONAL_ASSESSMENT: 0-10
PAIN_FUNCTIONAL_ASSESSMENT: FLACC (FACE, LEGS, ACTIVITY, CRY, CONSOLABILITY)
PAIN_FUNCTIONAL_ASSESSMENT: FLACC (FACE, LEGS, ACTIVITY, CRY, CONSOLABILITY)
PAIN_FUNCTIONAL_ASSESSMENT: 0-10

## 2025-02-12 ASSESSMENT — PAIN DESCRIPTION - LOCATION
LOCATION: HIP
LOCATION: HIP

## 2025-02-12 ASSESSMENT — PAIN SCALES - PAIN ASSESSMENT IN ADVANCED DEMENTIA (PAINAD): TOTALSCORE: MEDICATION (SEE MAR)

## 2025-02-12 ASSESSMENT — PAIN DESCRIPTION - DESCRIPTORS: DESCRIPTORS: ACHING

## 2025-02-12 ASSESSMENT — PAIN DESCRIPTION - ORIENTATION
ORIENTATION: LEFT
ORIENTATION: LEFT

## 2025-02-12 NOTE — NURSING NOTE
Rounded on pt. Pt laying in bed, eyes closed, respirations even and unlabored. Pt appears to be sleeping and in no apparent pain or distress. Vital signs deferred at this time to allow pt to rest. Pain management was re-addressed with pt about an hour, hour and a half after arrival. Pt reminded we can give her something for pain, but pt stated I don't want anything from you. Call bell and belongings are placed in reach. Bed alarm set. Continuing to monitor.

## 2025-02-12 NOTE — DISCHARGE INSTR - DIET
Dietitian Recommendations for Malnutrition: Recommend patient consumes a high calorie/high protein nutrition supplement 2-3 times daily to aid in weight gain/prevent weight loss after discharge.

## 2025-02-12 NOTE — CONSULTS
"Consults    Reason For Consult  Left hip fracture    History Of Present Illness  Cecelia Chávez is a 84 y.o. female presenting following a fall at home injuring her left hip.  She had pain and inability bear weight.  She was brought to the emergency room where x-rays revealed a displaced left femoral neck fracture.  He was admitted for pain control and discussion of definitive management.  Past Medical History  She has a past medical history of Ankle fracture, right (11/21/2024), Closed fracture of right distal fibula (11/21/2024), Hamstring tendonitis of right thigh (11/21/2024), Heart disease, Hypertension, Injury of right ankle (11/21/2024), and Injury of right foot (11/21/2024).    Surgical History  She has a past surgical history that includes Hysterectomy and Spine surgery.     Social History  She reports that she has been smoking cigarettes. She started smoking about 54 years ago. She has a 13.5 pack-year smoking history. She has never used smokeless tobacco. She reports current alcohol use. She reports that she does not use drugs.    Family History  Family History   Problem Relation Name Age of Onset    Heart disease Son          Allergies  Patient has no known allergies.    Review of Systems     Physical Exam nation of the left lower extremity finds it to be in a shortened and externally rotated position.  She has pain in the left hip with any degree of movement.  She has intact motor and sensory function distally.     Last Recorded Vitals  Blood pressure 123/78, pulse 76, temperature 37.1 °C (98.8 °F), temperature source Temporal, resp. rate 13, height 1.575 m (5' 2\"), weight (!) 43.1 kg (95 lb), SpO2 94%.    Relevant Results      Scheduled medications  [Transfer Hold] allopurinol, 300 mg, oral, Daily  [Transfer Hold] clotrimazole, , Topical, BID  [Transfer Hold] gabapentin, 300 mg, oral, Daily  [Transfer Hold] metoprolol succinate XL, 50 mg, oral, Daily  [Transfer Hold] pantoprazole, 40 mg, oral, Daily " before breakfast      Continuous medications     PRN medications  PRN medications: [Transfer Hold] morphine, [Transfer Hold] morphine, [Transfer Hold] ondansetron  Results for orders placed or performed during the hospital encounter of 02/10/25 (from the past 24 hours)   CBC and Auto Differential   Result Value Ref Range    WBC 6.4 4.4 - 11.3 x10*3/uL    nRBC 0.0 0.0 - 0.0 /100 WBCs    RBC 3.02 (L) 4.00 - 5.20 x10*6/uL    Hemoglobin 10.0 (L) 12.0 - 16.0 g/dL    Hematocrit 29.9 (L) 36.0 - 46.0 %    MCV 99 80 - 100 fL    MCH 33.1 26.0 - 34.0 pg    MCHC 33.4 32.0 - 36.0 g/dL    RDW 13.1 11.5 - 14.5 %    Platelets 162 150 - 450 x10*3/uL    Neutrophils % 73.9 40.0 - 80.0 %    Immature Granulocytes %, Automated 0.3 0.0 - 0.9 %    Lymphocytes % 14.1 13.0 - 44.0 %    Monocytes % 10.2 2.0 - 10.0 %    Eosinophils % 0.9 0.0 - 6.0 %    Basophils % 0.6 0.0 - 2.0 %    Neutrophils Absolute 4.73 1.60 - 5.50 x10*3/uL    Immature Granulocytes Absolute, Automated 0.02 0.00 - 0.50 x10*3/uL    Lymphocytes Absolute 0.90 0.80 - 3.00 x10*3/uL    Monocytes Absolute 0.65 0.05 - 0.80 x10*3/uL    Eosinophils Absolute 0.06 0.00 - 0.40 x10*3/uL    Basophils Absolute 0.04 0.00 - 0.10 x10*3/uL   Renal Function Panel   Result Value Ref Range    Glucose 116 (H) 74 - 99 mg/dL    Sodium 143 136 - 145 mmol/L    Potassium 3.5 3.5 - 5.3 mmol/L    Chloride 110 (H) 98 - 107 mmol/L    Bicarbonate 25 21 - 32 mmol/L    Anion Gap 12 10 - 20 mmol/L    Urea Nitrogen 18 6 - 23 mg/dL    Creatinine 0.89 0.50 - 1.05 mg/dL    eGFR 65 >60 mL/min/1.73m*2    Calcium 8.5 (L) 8.6 - 10.3 mg/dL    Phosphorus 3.3 2.5 - 4.9 mg/dL    Albumin 3.2 (L) 3.4 - 5.0 g/dL   CBC and Auto Differential   Result Value Ref Range    WBC 7.5 4.4 - 11.3 x10*3/uL    nRBC 0.0 0.0 - 0.0 /100 WBCs    RBC 3.08 (L) 4.00 - 5.20 x10*6/uL    Hemoglobin 10.0 (L) 12.0 - 16.0 g/dL    Hematocrit 31.6 (L) 36.0 - 46.0 %     (H) 80 - 100 fL    MCH 32.5 26.0 - 34.0 pg    MCHC 31.6 (L) 32.0 - 36.0 g/dL     RDW 13.1 11.5 - 14.5 %    Platelets 159 150 - 450 x10*3/uL    Neutrophils % 81.4 40.0 - 80.0 %    Immature Granulocytes %, Automated 0.3 0.0 - 0.9 %    Lymphocytes % 7.8 13.0 - 44.0 %    Monocytes % 9.8 2.0 - 10.0 %    Eosinophils % 0.3 0.0 - 6.0 %    Basophils % 0.4 0.0 - 2.0 %    Neutrophils Absolute 6.07 (H) 1.60 - 5.50 x10*3/uL    Immature Granulocytes Absolute, Automated 0.02 0.00 - 0.50 x10*3/uL    Lymphocytes Absolute 0.58 (L) 0.80 - 3.00 x10*3/uL    Monocytes Absolute 0.73 0.05 - 0.80 x10*3/uL    Eosinophils Absolute 0.02 0.00 - 0.40 x10*3/uL    Basophils Absolute 0.03 0.00 - 0.10 x10*3/uL        Assessment/Plan     I had a lengthy discussion with the patient as well as her daughter at the bedside regarding my recommendation for left hip hemiarthroplasty to optimize pain control and function.  We discussed the risks of surgery which include but not limited to infection, neurovascular injury, blood clot and pulmonary embolus, postoperative leg length change, dislocation and need for further surgery.  They elect to proceed.      Clarke Patel MD

## 2025-02-12 NOTE — NURSING NOTE
Rounded on pt. Pt laying in bed, eyes closed, respirations even and unlabored. Pt appears to be sleeping and in no apparent pain or distress. No changes since prior assessment. Pt awoke to use bedpan and still had same paranoia and delusions that we are keeping her daughter and dog from her (see prior note). Call bell and belongings are placed in reach. Bed alarm set. Continuing to monitor.

## 2025-02-12 NOTE — ED NOTES
"Came in at 1900, RN asked if I could check on the pt. Pt was screaming out her doorway for her daughter. I told the pt her daughter is not here.     Pt was covered in urine, purwick displaced, bed alarm not under pt. I changed the pt and placed the bed alarm under her. Pt is confused, screaming, and hitting staff. Pt stated \"you are trying to kill me and hid my daughter from me\".     Pt vitals unsuccessful due to combative behavior.      Maribel Cool, PCNA  02/11/25 2040    "

## 2025-02-12 NOTE — CARE PLAN
Problem: Pain - Adult  Goal: Verbalizes/displays adequate comfort level or baseline comfort level  Outcome: Progressing     Problem: Safety - Adult  Goal: Free from fall injury  Outcome: Progressing     Problem: Discharge Planning  Goal: Discharge to home or other facility with appropriate resources  Outcome: Progressing     Problem: Chronic Conditions and Co-morbidities  Goal: Patient's chronic conditions and co-morbidity symptoms are monitored and maintained or improved  Outcome: Progressing     Problem: Nutrition  Goal: Nutrient intake appropriate for maintaining nutritional needs  Outcome: Progressing     Problem: Pain  Goal: Takes deep breaths with improved pain control throughout the shift  Outcome: Progressing  Goal: Turns in bed with improved pain control throughout the shift  Outcome: Progressing  Goal: Walks with improved pain control throughout the shift  Outcome: Progressing  Goal: Performs ADL's with improved pain control throughout shift  Outcome: Progressing  Goal: Participates in PT with improved pain control throughout the shift  Outcome: Progressing  Goal: Free from opioid side effects throughout the shift  Outcome: Progressing  Goal: Free from acute confusion related to pain meds throughout the shift  Outcome: Progressing     Problem: Fall/Injury  Goal: Not fall by end of shift  Outcome: Progressing  Goal: Be free from injury by end of the shift  Outcome: Progressing  Goal: Verbalize understanding of personal risk factors for fall in the hospital  Outcome: Progressing  Goal: Verbalize understanding of risk factor reduction measures to prevent injury from fall in the home  Outcome: Progressing  Goal: Use assistive devices by end of the shift  Outcome: Progressing  Goal: Pace activities to prevent fatigue by end of the shift  Outcome: Progressing   The patient's goals for the shift include      The clinical goals for the shift include manage pain, keep pt safe from harm, no falls    Over the  shift, the patient did make progress toward the goals. Although pt refused pain medication, pt appeared to sleep in no apparent pain or distress.

## 2025-02-12 NOTE — PROGRESS NOTES
[facility]. Pts daughter lives with her but works during the day. Pt uses a walker. Pt and daughter state belief that she does have LW and HPOA, daughter will look with the paperwork. Discussion around dc planning, pt demonstrating a little confusion which the daughter states is not normal, attributes it to possibly the pain meds pt is on. Pt to have surgery this morning and then await PT/OT evals, in anticipation of possible moderate intensity SNF recommendation a SNF list was printed and given to the pt. If SNF need indicated and desired choices will be needed, no precert needed however no facility would be able to accept until on or after 2/13.     02/12/25 1318   Discharge Planning   Living Arrangements Children   Support Systems Children   Type of Residence Private residence   Number of Stairs to Enter Residence 3   Number of Stairs Within Residence 0   Do you have animals or pets at home? Yes   Type of Animals or Pets Dog   Who is requesting discharge planning? Provider   Home or Post Acute Services Post acute facilities (Rehab/SNF/etc);In home services   Expected Discharge Disposition Othe   Financial Resource Strain   How hard is it for you to pay for the very basics like food, housing, medical care, and heating? Not hard   Housing Stability   In the last 12 months, was there a time when you were not able to pay the mortgage or rent on time? N   In the past 12 months, how many times have you moved where you were living? 1   At any time in the past 12 months, were you homeless or living in a shelter (including now)? N   Transportation Needs   In the past 12 months, has lack of transportation kept you from medical appointments or from getting medications? no   In the past 12 months, has lack of transportation kept you from meetings, work, or from getting things needed for daily living? No   Patient Choice   Provider Choice list and CMS website  (https://medicare.gov/care-compare#search) for post-acute Quality and Resource Measure Data were provided and reviewed with: Patient;Family   Patient / Family choosing to utilize agency / facility established prior to hospitalization No   Stroke Family Assessment   Stroke Family Assessment Needed No

## 2025-02-12 NOTE — NURSING NOTE
Pt calling out from room. Letty, RN, and I assist pt onto a fracture pan. Pt lifts hips so pan can be placed. Pt grimaces and clutches left hip. Pt almost tearful, asking why would we do this to her. I remind her that she fell on her own at home. She states she knows that and again states she hasn't lost it. Pt clarifies and asks why we would take her daughter and her dog from her. Pt makes some comment about being in detention. I tell her that she came from the emergency room that I never met her dog and her daughter went home to let the dog out. Pt points out that she can't pee with us hovering, so we give her privacy. A few minutes later pt did void about 400 ml. We ask pt if she wants anything for pain. She tells us she wants nothing from us that she doesn't trust us. Letty suggests our male coworker could administer the pain medicine if she prefers. Pt hesitates, her lip quivering, appearing to think it over. I point out that she doesn't have to take the pain medicine but then she would just have to put up with the pain until they take her to surgery. Pt agreeable to Gabriele administering pain medicine. Will inform Gabriele and continue to monitor.

## 2025-02-12 NOTE — NURSING NOTE
Assumed care of pt, pt npo for surgery today, pt used bedpan appropriately for voiding, bed alarm in place for pt safety

## 2025-02-12 NOTE — ANESTHESIA PREPROCEDURE EVALUATION
Patient: Cecelia Chávez    Procedure Information       Date/Time: 02/12/25 1145    Procedure: HEMIARTHROPLASTY, HIP (Left: Hip)    Location: [facility] OR 08 / Virtual [facility] OR    Surgeons: Clarke Patel MD            Relevant Problems   Cardiac   (+) Primary hypertension      Musculoskeletal   (+) Chronic pain syndrome   (+) Other idiopathic scoliosis, thoracolumbar region   (+) Primary osteoarthritis involving multiple joints       Clinical information reviewed:     Meds               NPO Detail:  No data recorded     Physical Exam    Airway  Mallampati: II  TM distance: >3 FB  Neck ROM: full     Cardiovascular - normal exam     Dental - normal exam     Pulmonary - normal exam     Abdominal - normal exam           Anesthesia Plan    History of general anesthesia?: yes  History of complications of general anesthesia?: no    ASA 2     general     The patient is not a current smoker.  Patient was not previously instructed to abstain from smoking on day of procedure.  Patient did not smoke on day of procedure.  Education provided regarding risk of obstructive sleep apnea.  intravenous induction   Postoperative administration of opioids is intended.  Trial extubation is planned.  Anesthetic plan and risks discussed with patient.  Use of blood products discussed with patient who consented to blood products.    Plan discussed with CAA, attending and CRNA.

## 2025-02-12 NOTE — ANESTHESIA POSTPROCEDURE EVALUATION
Patient: Cecelia Chávez    Procedure Summary       Date: 02/12/25 Room / Location: [facility] OR 08 / [facility] OR    Anesthesia Start: 1314 Anesthesia Stop: 1515    Procedure: HEMIARTHROPLASTY, HIP (Left: Hip) Diagnosis:       Closed displaced midcervical fracture of left femur, initial encounter      (Closed displaced midcervical fracture of left femur, initial encounter [S72.032A])    Surgeons: Clarke Patel MD Responsible Provider: Micah Lua MD    Anesthesia Type: general ASA Status: 2            Anesthesia Type: general    Vitals Value Taken Time   /73 02/12/25 1551   Temp 36.1 °C (97 °F) 02/12/25 1505   Pulse 82 02/12/25 1552   Resp 14 02/12/25 1552   SpO2 96 % 02/12/25 1552   Vitals shown include unfiled device data.    Anesthesia Post Evaluation    Patient location during evaluation: PACU  Patient participation: complete - patient participated  Level of consciousness: sleepy but conscious  Pain score: 2  Pain management: adequate  Multimodal analgesia pain management approach  Airway patency: patent  Cardiovascular status: acceptable  Respiratory status: acceptable  Hydration status: acceptable  Postoperative Nausea and Vomiting: none        No notable events documented.

## 2025-02-12 NOTE — ASSESSMENT & PLAN NOTE
Status post hemiarthroplasty  Will make slight adjustments to pain management including scheduling Tylenol every 8 hours and lidocaine patch.  Anticoagulation per orthopedics.

## 2025-02-12 NOTE — CONSULTS
"Nutrition Assessement Note    Nutrition Assessment    Reason for Assessment: Admission nursing screening (MST 2 unsure)    Reason for Hospital Admission:  Cecelia Chávez is a 84 y.o. female who is admitted for left femoral fracture post fall. NPO for surgery today. Daughter bedside at time of visit, patient somewhat confused. Patient reports \"normal\" appetite prior to admission, unsure about weight changes. NFPE indicates severe deficits. Patient agreeable to nutritional supplements when diet resumes.    Malnutrition Screening Tool (MST)  Have you recently lost weight without trying?: Unsure  If yes, how much weight have you lost?: Unsure  Weight Loss Score: 2  Have you been eating poorly because of a decreased appetite?: No  Malnutrition Score: 2  Nutrition Screen  Stage 3 or 4 Pressure Injury or Multiple Non-Healing Wounds: No  Home Tube Feeding or Total Parenteral Nutrition (TPN): No  Dietitian Consult Needed: No    Past Medical History:   Diagnosis Date    Ankle fracture, right 11/21/2024    Closed fracture of right distal fibula 11/21/2024    Hamstring tendonitis of right thigh 11/21/2024    Heart disease     Hypertension     Injury of right ankle 11/21/2024    Injury of right foot 11/21/2024      Past Surgical History:   Procedure Laterality Date    HYSTERECTOMY      SPINE SURGERY         Nutrition History:  Food and Nutrient History: patient reports adequate po intake prior to admission  Energy Intake: Good > 75 %     Food Allergies/Intolerances:  None  GI Symptoms: None  Oral Problems: None    Anthropometrics:  Ht: 157.5 cm (5' 2\"), Wt: (!) 43.1 kg (95 lb), BMI: 17.37  IBW/kg (Dietitian Calculated): 50 kg  Percent of IBW: 86 %     Weight Change:  Daily Weight  02/10/25 : (!) 43.1 kg (95 lb)  11/21/24 : (!) 43.1 kg (95 lb)     Weight History / % Weight Change: patient/daughter unsure of weight changes     Nutrition Focused Physical Exam Findings:   Subcutaneous Fat Loss  Orbital Fat Pads: Severe (dark circles, " hollowing and loose skin)  Buccal Fat Pads: Severe (hollow, sunken and narrow face)  Triceps: Severe (negligible fat tissue)    Muscle Wasting  Temporalis: Severe (hollowed scooping depression)  Pectoralis (Clavicular Region): Severe (protruding prominent clavicle)  Deltoid/Trapezius: Severe (squared shoulders, acromion process prominent)    Nutrition Significant Labs:  Lab Results   Component Value Date    WBC 7.5 02/12/2025    HGB 10.0 (L) 02/12/2025    HCT 31.6 (L) 02/12/2025     02/12/2025    ALT 8 02/10/2025    AST 18 02/10/2025     02/12/2025    K 3.5 02/12/2025     (H) 02/12/2025    CREATININE 0.89 02/12/2025    BUN 18 02/12/2025    CO2 25 02/12/2025    INR 1.1 02/10/2025     Nutrition Specific Medications:  allopurinol, 300 mg, oral, Daily  clotrimazole, , Topical, BID  gabapentin, 300 mg, oral, Daily  metoprolol succinate XL, 50 mg, oral, Daily  pantoprazole, 40 mg, oral, Daily before breakfast         Dietary Orders (From admission, onward)       Start     Ordered    02/12/25 0001  NPO Diet; Effective midnight  Diet effective midnight         02/11/25 1133    02/11/25 2237  May Participate in Room Service With Assistance  ( ROOM SERVICE MAY PARTICIPATE WITH ASSISTANCE)  Once        Question:  .  Answer:  Yes    02/11/25 2236                   Estimated Needs:   Estimated Energy Needs  Total Energy Estimated Needs in 24 hours (kCal):  (9596-1009)  Energy Estimated Needs per kg Body Weight in 24 hours (kCal/kg):  (30-35)  Method for Estimating Needs: Actual Wt    Estimated Protein Needs  Total Protein Estimated Needs in 24 Hours (g):  (52-65)  Protein Estimated Needs per kg Body Weight in 24 Hours (g/kg):  (1.2-1.5)  Method for Estimating 24 Hour Protein Needs: Actual Wt    Estimated Fluid Needs  Total Fluid Estimated Needs in 24 Hours (mL):  (7733-1702)  Method for Estimating 24 Hour Fluid Needs: 1 mL/kcal      Nutrition Diagnosis   Nutrition Diagnosis:  Malnutrition Diagnosis  Patient  has Malnutrition Diagnosis: Yes  Diagnosis Status: New  Malnutrition Diagnosis: Severe malnutrition related to chronic disease or condition  As Evidenced by: Severe subcutaneous fat and muscle deficits    Nutrition Diagnosis  Patient has Nutrition Diagnosis: Yes  Diagnosis Status (1): New  Nutrition Diagnosis 1: Inadequate energy intake  Related to (1): decreased ability to consume sufficient energy  As Evidenced by (1): NPO       Nutrition Interventions/Recommendations   Nutrition Interventions and Recommendations:  Nutrition Prescription: Nutrition prescription for oral nutrition    Nutrition Recommendations:  Individualized Nutrition Prescription Provided for : 7806-5007 calories, 52-65 gm protein to be provided via diet/nutritional supplement    Nutrition Interventions/Goals:   Food and/or Nutrient Delivery Interventions  Interventions: Meals and snacks, Medical food supplement  Meals and Snacks: General healthful diet  Goal: resume as able  Medical Food Supplement: Commercial beverage medical food supplement therapy  Goal: ensure plus high protein TID to provide 350 kcals and 20g protein each (chocolate) when diet resumes    Education Documentation  No documentation found.         Nutrition Monitoring and Evaluation   Monitoring/Evaluation:   Food/Nutrient Related History Monitoring  Monitoring and Evaluation Plan: Estimated Energy Intake  Estimated Energy Intake: Other criteria  Criteria: monitoring for diet order    Anthropometric Measurements  Monitoring and Evaluation Plan: Body weight  Body Weight: Body weight - Promote weight restoration    Follow Up  Time Spent (min): 30 minutes  Last Date of Nutrition Visit: 02/12/25  Nutrition Follow-Up Needed?: 3-5 days  Follow up Comment: 2/17/25

## 2025-02-12 NOTE — ASSESSMENT & PLAN NOTE
Worsening last night with signs of paranoia.  Discussed with daughter possible delirium and sundowning.  Will schedule melatonin at 1800 today with day night cycle  Consider olanzapine if further issues.

## 2025-02-12 NOTE — CARE PLAN
Problem: Pain - Adult  Goal: Verbalizes/displays adequate comfort level or baseline comfort level  2/12/2025 0453 by Italia Adhikari RN  Outcome: Progressing  2/11/2025 2301 by Italia Adhikari RN  Outcome: Progressing     Problem: Safety - Adult  Goal: Free from fall injury  2/12/2025 0453 by Italia Adhikari RN  Outcome: Progressing  2/11/2025 2301 by Italia Adhikari RN  Outcome: Progressing     Problem: Discharge Planning  Goal: Discharge to home or other facility with appropriate resources  2/12/2025 0453 by Italia Adhikari RN  Outcome: Progressing  2/11/2025 2301 by Italia Adhikari RN  Outcome: Progressing     Problem: Chronic Conditions and Co-morbidities  Goal: Patient's chronic conditions and co-morbidity symptoms are monitored and maintained or improved  2/12/2025 0453 by Italia Adhikari RN  Outcome: Progressing  2/11/2025 2301 by Italia Adhikari RN  Outcome: Progressing     Problem: Nutrition  Goal: Nutrient intake appropriate for maintaining nutritional needs  2/12/2025 0453 by Italia Adhikari RN  Outcome: Progressing  2/11/2025 2301 by Italia Adhikari RN  Outcome: Progressing     Problem: Pain  Goal: Takes deep breaths with improved pain control throughout the shift  2/12/2025 0453 by Italia Adhikari RN  Outcome: Progressing  2/11/2025 2301 by Italia Adhikari RN  Outcome: Progressing  Goal: Turns in bed with improved pain control throughout the shift  2/12/2025 0453 by Italia Adhikari RN  Outcome: Progressing  2/11/2025 2301 by Italia Adhikari RN  Outcome: Progressing  Goal: Walks with improved pain control throughout the shift  2/12/2025 0453 by Italia Adhikari RN  Outcome: Progressing  2/11/2025 2301 by Italia Adhikari RN  Outcome: Progressing  Goal: Performs ADL's with improved pain control throughout shift  2/12/2025 0453 by Italia Adhikari RN  Outcome: Progressing  2/11/2025 2301 by Italia Adhikari RN  Outcome:  Progressing  Goal: Participates in PT with improved pain control throughout the shift  2/12/2025 0453 by Italia Adhikari RN  Outcome: Progressing  2/11/2025 2301 by Italia Adhikari RN  Outcome: Progressing  Goal: Free from opioid side effects throughout the shift  2/12/2025 0453 by Italia Adhikari RN  Outcome: Progressing  2/11/2025 2301 by Italia Adhikari RN  Outcome: Progressing  Goal: Free from acute confusion related to pain meds throughout the shift  2/12/2025 0453 by Italia Adhikari RN  Outcome: Progressing  2/11/2025 2301 by Italia Adhikari RN  Outcome: Progressing     Problem: Fall/Injury  Goal: Not fall by end of shift  2/12/2025 0453 by Italia Adhikari RN  Outcome: Progressing  2/11/2025 2301 by Italia Adhikari RN  Outcome: Progressing  Goal: Be free from injury by end of the shift  2/12/2025 0453 by Italia Adhikari RN  Outcome: Progressing  2/11/2025 2301 by Italia Adhikari RN  Outcome: Progressing  Goal: Verbalize understanding of personal risk factors for fall in the hospital  2/12/2025 0453 by Italia Adhikari RN  Outcome: Progressing  2/11/2025 2301 by Italia Adhikari RN  Outcome: Progressing  Goal: Verbalize understanding of risk factor reduction measures to prevent injury from fall in the home  2/12/2025 0453 by Italia Adhikari RN  Outcome: Progressing  2/11/2025 2301 by Italia Adhikari RN  Outcome: Progressing  Goal: Use assistive devices by end of the shift  2/12/2025 0453 by Italia Adhikari RN  Outcome: Progressing  2/11/2025 2301 by Italia Adhikari RN  Outcome: Progressing  Goal: Pace activities to prevent fatigue by end of the shift  2/12/2025 0453 by Italia Adihkari RN  Outcome: Progressing  2/11/2025 2301 by Italia Adhikari RN  Outcome: Progressing   The patient's goals for the shift include      The clinical goals for the shift include manage pain, keep pt safe from harm, no falls    Over the shift, the patient did make  progress toward the goals.

## 2025-02-12 NOTE — PROGRESS NOTES
Cecelia Chávez is a 84 y.o. female on day 2 of admission presenting with Closed displaced midcervical fracture of left femur.      Subjective   Patient seen after surgery with daughter at bedside.  She is somewhat pleasantly confused after going back to the PACU.  No current pain.    Overnight, patient was delirious with paranoid delusions; psychiatry attempted to see the patient today but patient was in surgery.  Discussed sundowning with daughter         Objective     Last Recorded Vitals  /59   Pulse 73   Temp 36.1 °C (97 °F) (Temporal)   Resp 18   Wt (!) 43.1 kg (95 lb)   SpO2 97%   Intake/Output last 3 Shifts:    Intake/Output Summary (Last 24 hours) at 2/12/2025 1704  Last data filed at 2/12/2025 1703  Gross per 24 hour   Intake 1136 ml   Output 1000 ml   Net 136 ml       Admission Weight  Weight: (!) 43.1 kg (95 lb) (02/10/25 1533)    Daily Weight  02/10/25 : (!) 43.1 kg (95 lb)    Image Results  XR pelvis 1-2 views  Narrative: Interpreted By:  Camryn Huitron,   STUDY:  XR PELVIS 1-2 VIEWS 2/12/2025 3:55 pm      INDICATION:  Signs/Symptoms:Post op hip      COMPARISON:  02/10/2025      ACCESSION NUMBER(S):  [id number]      ORDERING CLINICIAN:  EKTA AMBROCIO      TECHNIQUE:  AP view of the pelvis      FINDINGS:  There is postoperative change from left hip arthroplasty with  satisfactory positioning of the prosthesis. No acute bony abnormality  is seen.      Impression: Satisfactory postoperative appearance following left hip  hemiarthroplasty.      Signed by: Camryn Huitron 2/12/2025 4:02 PM  Dictation workstation:   [id number]      Physical Exam  HENT:      Right Ear: External ear normal.      Left Ear: External ear normal.      Mouth/Throat:      Mouth: Mucous membranes are moist.   Eyes:      Extraocular Movements: Extraocular movements intact.   Cardiovascular:      Rate and Rhythm: Normal rate.   Pulmonary:      Breath sounds: Normal breath sounds.   Abdominal:      Palpations: Abdomen is soft.    Musculoskeletal:      Right lower leg: No edema.      Left lower leg: No edema.   Skin:     General: Skin is warm.   Neurological:      General: No focal deficit present.      Mental Status: She is alert.   Psychiatric:      Comments: Cheerful mood         Relevant Results               Assessment/Plan                  Assessment & Plan  Closed displaced midcervical fracture of left femur  Status post hemiarthroplasty  Will make slight adjustments to pain management including scheduling Tylenol every 8 hours and lidocaine patch.  Anticoagulation per orthopedics.  Delirium  Worsening last night with signs of paranoia.  Discussed with daughter possible delirium and sundowning.  Will schedule melatonin at 1800 today with day night cycle  Consider olanzapine if further issues.  Fall, initial encounter  Physical therapy Occupational Therapy  Anemia  Stable  Will monitor especially postoperatively.    Disposition: Expect eventual placement at skilled nursing facility           Kvng Ortiz DO

## 2025-02-12 NOTE — INDIVIDUALIZED OVERALL PLAN OF CARE NOTE
The charts have been reviewed, and we attempted to see the patient; however, she was in the operating room during our visit. We anticipate that upon her return, she may not be able to participate in our assessment. Psychiatry has been consulted for concerns regarding delirium and paranoia.    We will follow up tomorrow to assess her condition and determine how we can assist further. We will also review any recommendations that may be appropriate at that time.

## 2025-02-12 NOTE — CARE PLAN
The patient's goals for the shift include      The clinical goals for the shift include have surgery , repair hip

## 2025-02-12 NOTE — ANESTHESIA PROCEDURE NOTES
Peripheral IV  Date/Time: 2/12/2025 1:19 PM  Inserted by: Micah Lua MD    Placement  Needle size: 20 G  Laterality: right  Location: forearm  Local anesthetic: none  Site prep: alcohol  Technique: anatomical landmarks  Attempts: 1

## 2025-02-12 NOTE — OP NOTE
HEMIARTHROPLASTY, HIP (L) Operative Note     Date: 2/10/2025 - 2025  OR Location: [facility] OR    Name: Cecelia Chávez, : 1940, Age: 84 y.o., MRN: [id number], Sex: female    Diagnosis  Pre-op Diagnosis      * Closed displaced midcervical fracture of left femur, initial encounter [S72.032A] Post-op Diagnosis     * Closed displaced midcervical fracture of left femur, initial encounter [S72.032A]     Procedures  HEMIARTHROPLASTY, HIP  [address] - [place] HEMIARTHROPLASTY HIP PARTIAL      Surgeons      * Clarke Patel - Primary    Resident/Fellow/Other Assistant:  Surgeons and Role:     * CHRISTINA Wilson-SERINA - MARANDA First Assist    Staff:   Circulator: Taylor  Surgical Assistant: Ehsan Sylvester Person: Carrie Rodriguez Circulator: Breanna    Anesthesia Staff: Anesthesiologist: Micah Lua MD  C-AA: OLLIE Brown    Procedure Summary  Anesthesia: General  ASA: II  Estimated Blood Loss: 100 mL  Intra-op Medications:   Administrations occurring from 1145 to 1400 on 25:   Medication Name Total Dose   allopurinol (Zyloprim) tablet 300 mg Cannot be calculated   ceFAZolin (Ancef) IV 2 g in 100 mL dextrose (iso) - premix 2 g   clotrimazole (Lotrimin) 1 % cream Cannot be calculated   dexAMETHasone (Decadron) 4 mg/mL 4 mg   fentaNYL PF 0.05 mg/mL 100 mcg   gabapentin (Neurontin) capsule 300 mg Cannot be calculated   LR bolus Cannot be calculated   lidocaine (Xylocaine) 1 % 50 mg   metoprolol succinate XL (Toprol-XL) 24 hr tablet 50 mg Cannot be calculated   morphine injection 2 mg Cannot be calculated   morphine injection 4 mg Cannot be calculated   ondansetron (Zofran) injection 4 mg Cannot be calculated   pantoprazole (ProtoNix) EC tablet 40 mg Cannot be calculated   phenylephrine 100 mcg/mL syringe 10 mL (prefilled) 200 mcg   propofol (Diprivan) injection 10 mg/mL 80 mg   rocuronium (ZeMuron) 50 mg/5 mL injection 75 mg   tranexamic acid (Cyklokapron) injection 1,000 mg   fentaNYL PF (Sublimaze) injection 50 mcg 50 mcg               Anesthesia Record               Intraprocedure I/O Totals          Intake    Tranexamic Acid 0.00 mL    The total shown is the total volume documented since Anesthesia Start was filed.    ceFAZolin 2 gram/100 mL 100.00 mL    Total Intake 100 mL          Specimen: No specimens collected              Drains and/or Catheters:   Urethral Catheter Non-latex 16 Fr. (Active)       Tourniquet Times:         Implants:  Implants       Type Name Action Serial No.      Joint Hip HEAD, FEMORAL, BIPOLAR, SELF-CENTERING, 28 MM, 45 MM - [id number] Implanted      Joint Hip HEAD, FEMORAL, 12/14 TAPER, ARTICUL/KARON, 28 MM, +1.5 MM NECK, COBALT CHROME - [id number] Implanted      Joint Hip HIP STEM, CORAIL AMT COLLAR SIZE 12 - [id number] Implanted               Findings: Displaced left femoral neck fracture    Indications: Cecelia Chávez is an 84 y.o. female who is having surgery for Closed displaced midcervical fracture of left femur, initial encounter [S72.032A].     The patient was seen in the preoperative area. The risks, benefits, complications, treatment options, non-operative alternatives, expected recovery and outcomes were discussed with the patient. The possibilities of reaction to medication, pulmonary aspiration, injury to surrounding structures, bleeding, recurrent infection, the need for additional procedures, failure to diagnose a condition, and creating a complication requiring transfusion or operation were discussed with the patient. The patient concurred with the proposed plan, giving informed consent.  The site of surgery was properly noted/marked if necessary per policy. The patient has been actively warmed in preoperative area. Preoperative antibiotics have been ordered and given within 1 hours of incision. Venous thrombosis prophylaxis are not indicated.    Procedure Details: The patient was seen in the preoperative holding area and the proper surgical site was identified and marked.  He was then brought  to the operating room and placed supine on the operating table.  After induction of satisfactory anesthesia and administration of prophylactic antibiotics he was placed in the lateral decubitus position on a pegboard with all bony prominences padded.  A time-out was called.  He was prepped and draped in standard sterile fashion.    A posterolateral skin incision was created taken out through the subcutaneous tissue and the underlying fascia.  I split the gluteus lester muscle in line with its fibers.  I then reflected the short external rotators from the posterior aspect of the greater trochanter.  I made a posterior capsulotomy preserving the capsular flap for later repair.  I then dislocated the hip without difficulty and made a femoral neck osteotomy according to preoperative and intraoperative templating.  I removed the femoral head without difficulty.  I then cleared the acetabulum of all intervening soft tissue.  A trial 45 mm bipolar head fit well.  I then turned attention to preparation of the femur.    I broached the femur sequentially up to a size 12 component which provided excellent rotational security.  Found the best restoration of leg length offset and stability with a +1.5 neck length a  I then impacted our final size 12 corail stem.  I reduced the hip without difficulty and found excellent restoration of leg length, offset and stability in all planes.  I then impacted our final size 28 mm / 45 mm/+1.5 bipolar femoral head.  I irrigated the joint area copiously with the pulsatile lavage as well as irrisept irrigation to further in anti sepsis.  I took care to coagulate all bleeding vessels.  I then performed a posterior capsular repair through drill holes in the posterior greater trochanter using 2. FiberWire.  I then closed the deep fascia with interrupted 1. Vicryl suture.  Closed the remainder of the wound in a standard layered fashion with 0 Vicryl followed by 2 0 Vicryl suture.  The skin was  approximated with a running 4 Monocryl stitch.  Sterile bandage was applied.  The patient awoke from anesthesia and transferred recovery room in stable condition.  Complications:  None; patient tolerated the procedure well.    Disposition: PACU - hemodynamically stable.  Condition: stable         Task Performed by RNFA or Surgical Assistant:  Retraction and wound closure      Task Performed by MARANDA First Assist or Physician Assistant:   PA/NP, was necessary to assist on this case due to the nature of the case and difficulty. During the case  served as my assist by       Additional Details:     Attending Attestation:     Clarke Patel  Phone Number: [phone]

## 2025-02-12 NOTE — ANESTHESIA PROCEDURE NOTES
Airway  Date/Time: 2/12/2025 1:22 PM  Urgency: elective    Airway not difficult    Staffing  Performed: attending   Authorized by: Micah Lua MD    Performed by: OLLIE Brown  Patient location during procedure: OR    Indications and Patient Condition  Indications for airway management: anesthesia  Spontaneous Ventilation: absent  Sedation level: deep  Preoxygenated: yes  Patient position: sniffing  Mask difficulty assessment: 1 - vent by mask    Final Airway Details  Final airway type: endotracheal airway      Successful airway: ETT  Cuffed: yes   Successful intubation technique: direct laryngoscopy  Facilitating devices/methods: intubating stylet  Endotracheal tube insertion site: oral  Blade: Kala  Blade size: #3  ETT size (mm): 7.0  Cormack-Lehane Classification: grade IIb - view of arytenoids or posterior of glottis only  Placement verified by: capnometry   Measured from: lips  ETT to lips (cm): 22  Number of attempts at approach: 2  Ventilation between attempts: none  Number of other approaches attempted: 1    Other Attempts  Unsuccessful attempted airways: endotracheal tube  Unsuccessful attempted endotracheal techniques: direct laryngoscopy    Additional Comments  First intubation attempted by OLLIE via direct laryngoscopy-- Grade 3 view of epiglottis only, no end tidal CO2 on manual ventilation. Second intubation attempted by attending anesthesiologist via direct laryngoscopy-- Grade 2b view of arytenoids. Intubation successful.

## 2025-02-12 NOTE — NURSING NOTE
Pt arrived to W4E via cart from ED at this time unaccompanied except for ED staff member. Pt heard yelling for help they're keeping her daughter from her as cart rolls onto floor. Pt is slid from cart to bed with verbal protest. Pt alert, aox2? Orientation wavers. She informs me she knows she has a broken hip and they are planning for surgery tomorrow, but at same time refuses to accept that her daughter went home to let the dog out and tells me she broke her him, she didn't lose her mind. She insists she is downstairs and we are keeping her from making contact. My coworker nurse, Letty, asks the pt what she wants to be called. Pt says something to imply she should already know that. Letty tells pt she wouldn't know this is the first time they've met. Pt tells Letty that she has known her her whole life, and she has a brother. Letty coincidentally has a brother. Pt refusing vitals signs, clutching her arms to her chest and telling us not to touch her. Pt initially agrees to pain medicine and acknowledges being cold. So a warm blanket is brought, but when I bring morphine, pt tells me she doesn't want it. Complete assessment not able to be performed at this time due to pt's behavior. Pt oriented to call bell use, bed functions, room layout, and telephone use, but initially refuses call light tossing it out of bed. We inform her again it is to call us for help, and pt states she doesn't need help. When I use land line phone to try to call pt's daughter, pt yells at PCA that I have a cord and to keep me away from her with the cord. Pt's daughter's number is busy. Call bell and belongings are placed in reach. Bed alarm set. Continuing to monitor.

## 2025-02-12 NOTE — CARE PLAN
The patient's goals for the shift include      The clinical goals for the shift include manage pain, keep pt safe from harm, no falls

## 2025-02-13 LAB
ALBUMIN SERPL BCP-MCNC: 2.8 G/DL (ref 3.4–5)
ANION GAP SERPL CALCULATED.3IONS-SCNC: 10 MMOL/L (ref 10–20)
BASOPHILS # BLD AUTO: 0.01 X10*3/UL (ref 0–0.1)
BASOPHILS NFR BLD AUTO: 0.1 %
BUN SERPL-MCNC: 22 MG/DL (ref 6–23)
CALCIUM SERPL-MCNC: 7.9 MG/DL (ref 8.6–10.3)
CHLORIDE SERPL-SCNC: 108 MMOL/L (ref 98–107)
CO2 SERPL-SCNC: 26 MMOL/L (ref 21–32)
CREAT SERPL-MCNC: 1.12 MG/DL (ref 0.5–1.05)
EGFRCR SERPLBLD CKD-EPI 2021: 49 ML/MIN/1.73M*2
EOSINOPHIL # BLD AUTO: 0 X10*3/UL (ref 0–0.4)
EOSINOPHIL NFR BLD AUTO: 0 %
ERYTHROCYTE [DISTWIDTH] IN BLOOD BY AUTOMATED COUNT: 13.1 % (ref 11.5–14.5)
GLUCOSE SERPL-MCNC: 138 MG/DL (ref 74–99)
HCT VFR BLD AUTO: 28.2 % (ref 36–46)
HGB BLD-MCNC: 9.2 G/DL (ref 12–16)
IMM GRANULOCYTES # BLD AUTO: 0.03 X10*3/UL (ref 0–0.5)
IMM GRANULOCYTES NFR BLD AUTO: 0.4 % (ref 0–0.9)
LYMPHOCYTES # BLD AUTO: 0.5 X10*3/UL (ref 0.8–3)
LYMPHOCYTES NFR BLD AUTO: 6.5 %
MCH RBC QN AUTO: 32.3 PG (ref 26–34)
MCHC RBC AUTO-ENTMCNC: 32.6 G/DL (ref 32–36)
MCV RBC AUTO: 99 FL (ref 80–100)
MONOCYTES # BLD AUTO: 0.62 X10*3/UL (ref 0.05–0.8)
MONOCYTES NFR BLD AUTO: 8 %
NEUTROPHILS # BLD AUTO: 6.56 X10*3/UL (ref 1.6–5.5)
NEUTROPHILS NFR BLD AUTO: 85 %
NRBC BLD-RTO: 0 /100 WBCS (ref 0–0)
PHOSPHATE SERPL-MCNC: 3.8 MG/DL (ref 2.5–4.9)
PLATELET # BLD AUTO: 162 X10*3/UL (ref 150–450)
POTASSIUM SERPL-SCNC: 3.9 MMOL/L (ref 3.5–5.3)
RBC # BLD AUTO: 2.85 X10*6/UL (ref 4–5.2)
SODIUM SERPL-SCNC: 140 MMOL/L (ref 136–145)
WBC # BLD AUTO: 7.7 X10*3/UL (ref 4.4–11.3)

## 2025-02-13 PROCEDURE — 36415 COLL VENOUS BLD VENIPUNCTURE: CPT | Performed by: INTERNAL MEDICINE

## 2025-02-13 PROCEDURE — 1100000001 HC PRIVATE ROOM DAILY

## 2025-02-13 PROCEDURE — 2500000004 HC RX 250 GENERAL PHARMACY W/ HCPCS (ALT 636 FOR OP/ED): Performed by: ORTHOPAEDIC SURGERY

## 2025-02-13 PROCEDURE — 85025 COMPLETE CBC W/AUTO DIFF WBC: CPT | Performed by: INTERNAL MEDICINE

## 2025-02-13 PROCEDURE — 92610 EVALUATE SWALLOWING FUNCTION: CPT | Mod: GN

## 2025-02-13 PROCEDURE — 97166 OT EVAL MOD COMPLEX 45 MIN: CPT | Mod: GO

## 2025-02-13 PROCEDURE — 2500000002 HC RX 250 W HCPCS SELF ADMINISTERED DRUGS (ALT 637 FOR MEDICARE OP, ALT 636 FOR OP/ED)

## 2025-02-13 PROCEDURE — 99232 SBSQ HOSP IP/OBS MODERATE 35: CPT

## 2025-02-13 PROCEDURE — 90785 PSYTX COMPLEX INTERACTIVE: CPT

## 2025-02-13 PROCEDURE — 2500000001 HC RX 250 WO HCPCS SELF ADMINISTERED DRUGS (ALT 637 FOR MEDICARE OP): Performed by: INTERNAL MEDICINE

## 2025-02-13 PROCEDURE — 97530 THERAPEUTIC ACTIVITIES: CPT | Mod: GO

## 2025-02-13 PROCEDURE — 80069 RENAL FUNCTION PANEL: CPT | Performed by: INTERNAL MEDICINE

## 2025-02-13 PROCEDURE — 2500000005 HC RX 250 GENERAL PHARMACY W/O HCPCS: Performed by: INTERNAL MEDICINE

## 2025-02-13 PROCEDURE — 90792 PSYCH DIAG EVAL W/MED SRVCS: CPT

## 2025-02-13 PROCEDURE — 97162 PT EVAL MOD COMPLEX 30 MIN: CPT | Mod: GP

## 2025-02-13 RX ORDER — OLANZAPINE 5 MG/1
5 TABLET, ORALLY DISINTEGRATING ORAL NIGHTLY
Status: COMPLETED | OUTPATIENT
Start: 2025-02-13 | End: 2025-02-15

## 2025-02-13 RX ORDER — HYDROXYZINE HYDROCHLORIDE 10 MG/1
10 TABLET, FILM COATED ORAL EVERY 4 HOURS PRN
Status: DISCONTINUED | OUTPATIENT
Start: 2025-02-13 | End: 2025-02-17 | Stop reason: HOSPADM

## 2025-02-13 RX ORDER — OLANZAPINE 2.5 MG/1
2.5 TABLET ORAL EVERY 8 HOURS PRN
Status: DISCONTINUED | OUTPATIENT
Start: 2025-02-13 | End: 2025-02-17 | Stop reason: HOSPADM

## 2025-02-13 RX ORDER — OLANZAPINE 10 MG/2ML
2.5 INJECTION, POWDER, FOR SOLUTION INTRAMUSCULAR EVERY 8 HOURS PRN
Status: DISCONTINUED | OUTPATIENT
Start: 2025-02-13 | End: 2025-02-17 | Stop reason: HOSPADM

## 2025-02-13 RX ORDER — MORPHINE SULFATE 4 MG/ML
2 INJECTION, SOLUTION INTRAMUSCULAR; INTRAVENOUS
Status: DISCONTINUED | OUTPATIENT
Start: 2025-02-13 | End: 2025-02-17 | Stop reason: HOSPADM

## 2025-02-13 RX ORDER — TALC
3 POWDER (GRAM) TOPICAL DAILY
Status: DISCONTINUED | OUTPATIENT
Start: 2025-02-13 | End: 2025-02-17 | Stop reason: HOSPADM

## 2025-02-13 RX ADMIN — GABAPENTIN 300 MG: 300 CAPSULE ORAL at 08:16

## 2025-02-13 RX ADMIN — LIDOCAINE 1 PATCH: 4 PATCH TOPICAL at 22:07

## 2025-02-13 RX ADMIN — ACETAMINOPHEN 975 MG: 325 TABLET, FILM COATED ORAL at 08:15

## 2025-02-13 RX ADMIN — ACETAMINOPHEN 975 MG: 325 TABLET, FILM COATED ORAL at 16:12

## 2025-02-13 RX ADMIN — HYDROCODONE BITARTRATE AND ACETAMINOPHEN 1 TABLET: 5; 325 TABLET ORAL at 22:06

## 2025-02-13 RX ADMIN — PANTOPRAZOLE SODIUM 40 MG: 40 TABLET, DELAYED RELEASE ORAL at 06:02

## 2025-02-13 RX ADMIN — ASPIRIN 325 MG: 325 TABLET, COATED ORAL at 22:06

## 2025-02-13 RX ADMIN — DOCUSATE SODIUM 100 MG: 100 CAPSULE, LIQUID FILLED ORAL at 08:16

## 2025-02-13 RX ADMIN — OLANZAPINE 5 MG: 5 TABLET, ORALLY DISINTEGRATING ORAL at 22:06

## 2025-02-13 RX ADMIN — HYDROCODONE BITARTRATE AND ACETAMINOPHEN 1 TABLET: 5; 325 TABLET ORAL at 05:58

## 2025-02-13 RX ADMIN — ALLOPURINOL 300 MG: 300 TABLET ORAL at 08:15

## 2025-02-13 RX ADMIN — ASPIRIN 325 MG: 325 TABLET, COATED ORAL at 08:15

## 2025-02-13 RX ADMIN — DOCUSATE SODIUM 100 MG: 100 CAPSULE, LIQUID FILLED ORAL at 22:06

## 2025-02-13 RX ADMIN — CEFAZOLIN SODIUM 2 G: 2 INJECTION, SOLUTION INTRAVENOUS at 05:49

## 2025-02-13 RX ADMIN — SODIUM CHLORIDE 80 ML/HR: 900 INJECTION, SOLUTION INTRAVENOUS at 07:11

## 2025-02-13 ASSESSMENT — COGNITIVE AND FUNCTIONAL STATUS - GENERAL
STANDING UP FROM CHAIR USING ARMS: TOTAL
MOVING FROM LYING ON BACK TO SITTING ON SIDE OF FLAT BED WITH BEDRAILS: A LOT
WALKING IN HOSPITAL ROOM: TOTAL
TURNING FROM BACK TO SIDE WHILE IN FLAT BAD: TOTAL
DAILY ACTIVITIY SCORE: 13
DRESSING REGULAR LOWER BODY CLOTHING: A LOT
DAILY ACTIVITIY SCORE: 18
MOBILITY SCORE: 13
HELP NEEDED FOR BATHING: A LOT
TURNING FROM BACK TO SIDE WHILE IN FLAT BAD: A LITTLE
MOVING TO AND FROM BED TO CHAIR: A LOT
TOILETING: TOTAL
WALKING IN HOSPITAL ROOM: TOTAL
STANDING UP FROM CHAIR USING ARMS: A LOT
MOBILITY SCORE: 7
HELP NEEDED FOR BATHING: TOTAL
CLIMB 3 TO 5 STEPS WITH RAILING: TOTAL
PERSONAL GROOMING: A LITTLE
DRESSING REGULAR UPPER BODY CLOTHING: A LITTLE
TOILETING: A LITTLE
DRESSING REGULAR UPPER BODY CLOTHING: A LITTLE
MOVING TO AND FROM BED TO CHAIR: TOTAL
CLIMB 3 TO 5 STEPS WITH RAILING: TOTAL
DRESSING REGULAR LOWER BODY CLOTHING: TOTAL

## 2025-02-13 ASSESSMENT — PAIN DESCRIPTION - DESCRIPTORS
DESCRIPTORS: ACHING
DESCRIPTORS: ACHING

## 2025-02-13 ASSESSMENT — PAIN - FUNCTIONAL ASSESSMENT
PAIN_FUNCTIONAL_ASSESSMENT: FLACC (FACE, LEGS, ACTIVITY, CRY, CONSOLABILITY)
PAIN_FUNCTIONAL_ASSESSMENT: 0-10
PAIN_FUNCTIONAL_ASSESSMENT: FLACC (FACE, LEGS, ACTIVITY, CRY, CONSOLABILITY)
PAIN_FUNCTIONAL_ASSESSMENT: 0-10

## 2025-02-13 ASSESSMENT — PAIN SCALES - GENERAL
PAINLEVEL_OUTOF10: 5 - MODERATE PAIN
PAINLEVEL_OUTOF10: 0 - NO PAIN
PAINLEVEL_OUTOF10: 5 - MODERATE PAIN
PAINLEVEL_OUTOF10: 2
PAINLEVEL_OUTOF10: 4
PAINLEVEL_OUTOF10: 2
PAINLEVEL_OUTOF10: 0 - NO PAIN

## 2025-02-13 ASSESSMENT — ENCOUNTER SYMPTOMS
AGITATION: 0
NERVOUS/ANXIOUS: 1
WEAKNESS: 1
FATIGUE: 1
CONFUSION: 1
HYPERACTIVE: 0
ACTIVITY CHANGE: 1
DECREASED CONCENTRATION: 1
ARTHRALGIAS: 1
APPETITE CHANGE: 1
SLEEP DISTURBANCE: 1
HALLUCINATIONS: 0
MYALGIAS: 1
DYSPHORIC MOOD: 1

## 2025-02-13 ASSESSMENT — ACTIVITIES OF DAILY LIVING (ADL)
ADL_ASSISTANCE: NEEDS ASSISTANCE
BATHING_ASSISTANCE: MAXIMAL

## 2025-02-13 NOTE — NURSING NOTE
Rounded on pt. Pt laying in bed, eyes closed, respirations even and unlabored. Pt appears to be sleeping and in no apparent pain or distress. Left hip dressing unchanged since initial assessment. Call bell and belongings are placed in reach. Bed alarm set. Continuing to monitor.

## 2025-02-13 NOTE — PROGRESS NOTES
Physical Therapy    Physical Therapy Evaluation    Patient Name: Cecelia Chávez  MRN: [id number]  Department: [address]  Room: [address] [place] [address]-  Today's Date: 2/13/2025   Time Calculation  Start Time: 0925  Stop Time: 0945  Time Calculation (min): 20 min    Assessment/Plan   PT Assessment  PT Assessment Results: Decreased strength, Decreased range of motion, Decreased endurance, Impaired balance, Decreased mobility, Decreased coordination, Decreased safety awareness, Orthopedic restrictions, Pain  Rehab Prognosis: Good  Barriers to Discharge Home: Caregiver assistance, Physical needs  Caregiver Assistance: Caregiver assistance needed per identified barriers - however, level of patient's required assistance exceeds assistance available at home  Physical Needs: Stair navigation into home limited by function/safety, Ambulating household distances limited by function/safety, High falls risk due to function or environment  Evaluation/Treatment Tolerance: Patient tolerated treatment well  Medical Staff Made Aware: Yes  Strengths: Ability to acquire knowledge, Attitude of self  Barriers to Participation: Comorbidities  End of Session Communication: Bedside nurse  Assessment Comment: pt demonstrates decline in functional mobility compared to baseline level. pt with significant L LE hip pain, impaired strength, balance and overall limited activity tolerance s/p R hip surgery. pt is a high falls risk and would benefit from continued skilled PT prior to and upon discharge in order to address the above deficits and maximize functional performance and mobility.  End of Session Patient Position: Up in chair, Alarm on (call bell in reach, all needs met. RN aware)  IP OR SWING BED PT PLAN  Inpatient or Swing Bed: Inpatient  PT Plan  Treatment/Interventions: Bed mobility, Transfer training, Gait training, Stair training, Balance training, Strengthening, Endurance training, Range of motion, Therapeutic exercise, Therapeutic activity, Home  exercise program, Positioning, Postural re-education  PT Plan: Ongoing PT  PT Frequency: Daily  PT Discharge Recommendations: Moderate intensity level of continued care  Equipment Recommended upon Discharge: Wheeled walker  PT Recommended Transfer Status: Assist x1, Assistive device (RW)  PT - OK to Discharge: Yes (PT POC initiated)    Subjective   General Visit Information:  General  Reason for Referral: impaired mobility; Pt. is an 84 year old female admit with midcervical fracture of left femur. Pt. has a fall at home and was down for ~ 1 hour before someone found her and helped her up. Pt. did hit her head during fall. Pt. had a hip hemiarthroplastly by Dr. 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 safety and functional mobility  Prior to Session Communication: Bedside nurse  Patient Position Received: Bed, 3 rail up, Alarm off, not on at start of session  Preferred Learning Style: verbal, visual  General Comment: pt cleared for therapy via RN, agreeable to participate, received supine in bed; +tele, urinary cath  Home Living:  Home Living  Type of Home: House  Lives With: Adult children  Home Adaptive Equipment: Walker rolling or standard, Wheelchair-manual  Home Layout: One level  Home Access: Stairs to enter without rails  Entrance Stairs-Number of Steps: 3  Bathroom Shower/Tub: Tub/shower unit  Bathroom Equipment: Shower chair with back  Bathroom Accessibility: pt. reports she has not used tub shower due to fear of falling. she reports using sink and sponge bathing.  Prior Level of Function:  Prior Function Per Pt/Caregiver Report  Level of [place]: Needs assistance with ADLs, Needs assistance with homemaking, Needs assistance with functional  transfers  Receives Help From: Family  ADL Assistance: Needs assistance  Homemaking Assistance: Needs assistance  Ambulatory Assistance: Independent (mod I with use of RW)  Prior Function Comments: Pt. reports she uses mainly a rolling walker for functional mobility but also has access to a wheelchair. Pt. is dependent on adult children for transportation and assist with ADLs; hx of falls, does not drive; dtr works outside of home during the day; able to assist pt at night only  Precautions:  Precautions  Hearing/Visual Limitations: mild [place]  LE Weight Bearing Status: Weight Bearing as Tolerated  Medical Precautions: Fall precautions  Post-Surgical Precautions: Left hip precautions  Precautions Comment: pt. educated on hip precautions      Date/Time Vitals Session Patient Position Pulse Resp SpO2 BP MAP (mmHg)    02/13/25 1056 --  --  60  18  98 %  114/57  69                 Objective   Pain:  Pain Assessment  Pain Assessment: FLACC (Face, Legs, Activity, Cry, Consolability)  0-10 (Numeric) Pain Score: 5 - Moderate pain  Pain Type: Surgical pain  Pain Interventions: Repositioned, Ambulation/increased activity  Response to Interventions: Content/relaxed  Cognition:  Cognition  Overall Cognitive Status: Impaired  Orientation Level: Oriented X4  Following Commands: Follows one step commands with increased time  Safety Judgment: Decreased awareness of need for safety precautions  Cognition Comments: pt appears paranoid and perseverating on daughter; req freq redirection  Insight: Mild  Impulsive: Mildly  Processing Speed: Delayed    General Assessments:  General Observation  General Observation: pt is alert, pleasant and cooperative; fear of falling throughout session               Activity Tolerance  Endurance: Decreased tolerance for upright activites  Activity Tolerance Comments: limited by pain and weakness  Rate of Perceived Exertion (RPE): 6/10    Sensation  Sensation Comment: pt reports paresthesias B  feet    Strength  Strength Comments: R LE grossly >/= 3+/5; L LE grossly >/= 3/5 s/p hip surgery  Strength  Strength Comments: R LE grossly >/= 3+/5; L LE grossly >/= 3/5 s/p hip surgery           Coordination  Movements are Fluid and Coordinated: No  Coordination Comment: decreased rate and accuracy of movement    Postural Control  Postural Control: Impaired  Posture Comment: fwd head, rounded shoulders    Static Sitting Balance  Static Sitting-Balance Support: Bilateral upper extremity supported, Feet supported  Static Sitting-Level of Assistance: Distant supervision  Static Sitting-Comment/Number of Minutes: good+ sitting in chair at end of session  Dynamic Sitting Balance  Dynamic Sitting-Balance Support: Bilateral upper extremity supported, Feet supported  Dynamic Sitting-Level of Assistance: Contact guard  Dynamic Sitting-Comments: fair+ sitting on EOB    Static Standing Balance  Static Standing-Balance Support: Bilateral upper extremity supported (on RW)  Static Standing-Level of Assistance: Maximum assistance (x2)  Static Standing-Comment/Number of Minutes: poor+/fair- static standing balance with use of RW and max A x 2 to steady; B soft knees  Dynamic Standing Balance  Dynamic Standing-Balance Support: Bilateral upper extremity supported (on RW)  Dynamic Standing-Level of Assistance: Maximum assistance (x2)  Dynamic Standing-Balance: Turning  Dynamic Standing-Comments: fair- with use of RW and max A x 2 for transfer to chair  Functional Assessments:  Bed Mobility  Bed Mobility: Yes  Bed Mobility 1  Bed Mobility 1: Supine to sitting, Scooting  Level of Assistance 1: Maximum assistance, +2  Bed Mobility Comments 1: supine > sit with max A x 2 for trunk up and B LEs off EOB; pt utilized log roll technique with pillow placed between B LEs to maintain hip precautions; increased time/effort; pt instructed on pursed lip breathing throughout all mobility    Transfers  Transfer: Yes  Transfer 1  Transfer From 1:  Bed to  Transfer to 1: Stand  Technique 1: Sit to stand  Transfer Device 1: Walker  Transfer Level of Assistance 1: Maximum assistance, +2  Trials/Comments 1: sit > stand with use of RW and max A x 2 for fwd weight shift and trunk elevation; pt pulled to stand with use of RW; however, educated on safe hand placement for further tranfers; VCs and TCs to activate gluts for improved upright standing posture  Transfers 2  Transfer From 2: Stand to  Transfer to 2: Chair with arms  Technique 2: Stand to sit  Transfer Device 2: Walker  Transfer Level of Assistance 2: Maximum assistance, +2  Trials/Comments 2: max A x 2 for controlled lowering into chair; VCs for safe B hand and B LE placement prior to sit    Ambulation/Gait Training  Ambulation/Gait Training Performed: Yes (pt took ~6-7 steps from bed> chair with use of RW and max A x 2 for stability and safety; pt with B soft knees, antalgic gait pattern; instructed on safe walker management with intermittent assist to guide walker; educated on proper gait sequencing)    Stairs  Stairs: No  Extremity/Trunk Assessments:        Outcome Measures:  [facility] Basic Mobility  Turning from your back to your side while in a flat bed without using bedrails: A lot  Moving from lying on your back to sitting on the side of a flat bed without using bedrails: Total  Moving to and from bed to chair (including a wheelchair): Total  Standing up from a chair using your arms (e.g. wheelchair or bedside chair): Total  To walk in hospital room: Total  Climbing 3-5 steps with railing: Total  Basic Mobility - Total Score: 7    Encounter Problems       Encounter Problems (Active)       Mobility       STG - Patient will ambulate 30' with use of RW and min A, maintaining L LE hip precautions, WBAT       Start:  02/13/25    Expected End:  02/27/25            STG - Patient will ascend and descend 3 steps with pascale handrails, step-to pattern, WBAT, maintaining L LE hip precautions        Start:  02/13/25     Expected End:  02/27/25               PT Transfers       STG - Patient will perform bed mobility with min A        Start:  02/13/25    Expected End:  02/27/25            STG - Patient will transfer sit to and from stand with min A, maintaining L LE hip precautions, WBAT        Start:  02/13/25    Expected End:  02/27/25               Pain - Adult          Safety       LTG - Patient will adhere to hip precautions during ADL's and transfers       Start:  02/13/25    Expected End:  02/27/25                   Education Documentation  Precautions, taught by Chris Cason PT at 2/13/2025 11:18 AM.  Learner: Family, Patient  Readiness: Acceptance  Method: Explanation, Demonstration  Response: Verbalizes Understanding  Comment: pt educated on POC, safe mobility techniques, L LE hip precautions    Body Mechanics, taught by Chris Cason PT at 2/13/2025 11:18 AM.  Learner: Family, Patient  Readiness: Acceptance  Method: Explanation, Demonstration  Response: Verbalizes Understanding  Comment: pt educated on POC, safe mobility techniques, L LE hip precautions    Mobility Training, taught by Chris Cason PT at 2/13/2025 11:18 AM.  Learner: Family, Patient  Readiness: Acceptance  Method: Explanation, Demonstration  Response: Verbalizes Understanding  Comment: pt educated on POC, safe mobility techniques, L LE hip precautions    Education Comments  No comments found.

## 2025-02-13 NOTE — CARE PLAN
The patient's goals for the shift include      The clinical goals for the shift include manage pain

## 2025-02-13 NOTE — ASSESSMENT & PLAN NOTE
Agitation due to delirium superimposed on underlying cognitive impairment.      Plan:    - Can give olanzapine 2.5 mg PO or 2.5mg IM Q6H PRN for agitation.  - Start Scheduled Zyprexa Zydis at bedtime to help with behavioral disturbances and impulse control, increase appetite and sleep.  - Decrease  melatonin to 3 mg PO and give at 1800 to aid sleep-wake cycle  - Start Hydroxyzine 10 mg Q4H PRN for anxiety, restlessness     Would recommend the use of delirium precautions with this patient:   -- Minimize use of benzos, opiates, anticholinergics, as these may worsen mental status   -- Would use caution with narcotic pain medications   -- Would still adequately controlling pain, as uncontrolled pain is also a risk factor for delirium   -- Reinforce sleep hygiene; encourage patient to stay awake during the day   -- Keep curtains/blinds open during the day and closed at night.   -- Would recommend reorienting/redirecting patient as much as possible,    -- Aim for consistent staffing, familiar objects, avoiding bright lights and loud noises, etc.

## 2025-02-13 NOTE — PROGRESS NOTES
"Speech-Language Pathology    Inpatient Speech-Language Pathology Clinical Swallow Evaluation       Patient Name: Cecelia Chávez  MRN: [id number]  : 1940  Today's Date: 25  Time Calculation  Start Time: 1410  Stop Time: 1500  Time Calculation (min): 50 min       Current Problem:  1. Fall, initial encounter        2. Closed displaced midcervical fracture of left femur, initial encounter  Case Request Operating Room: HEMIARTHROPLASTY, HIP    Case Request Operating Room: HEMIARTHROPLASTY, HIP      3. Closed displaced fracture of left femoral neck            Past Medical History:  Past Medical History:   Diagnosis Date    Ankle fracture, right 2024    Closed fracture of right distal fibula 2024    Hamstring tendonitis of right thigh 2024    Heart disease     Hypertension     Injury of right ankle 2024    Injury of right foot 2024       Reason for Referral:  Pt presented to ED on 2/10/25 after fall at home with inability to move her left side.  Pt admitted for left hip fracture and underwent hemiarthroplasty.  Pt reported swallowing difficulty to CNP during follow-up consultation today.  Pt referred to Speech-Language Pathology services for Clinical Swallow Evaluation in order to assess current swallow skills and determine plan of treatment.      Relevant Imaging Results:  CXR 2/10/25  IMPRESSION:  1.  Probably benign lucencies overlying the left mid chest. No  evident acute cardiopulmonary abnormality.  2. Marked prominence of the aortic shadow.    Assessment    Pt reports difficulty swallowing for about the past year, stating \"everything gets stuck on the left side of my throat\".  She reports that she has net been able to chew well due to missing dentition, and has had limited intake and weight loss because of these problems.  Pt had difficulty staying on topic for questions presented related to swallowing abilities at times.  When asked if she had a history of reflux, she " "reported hip pain and multiple falls.    Pt required max encouragement for intake of diagnostic PO trials, stating that she had just had lunch and she felt like everything was \"stuck in there\", pointing to her throat.  Pt did not demonstrate cough or throat clear post swallow with trials of thin liquids, regular solids, and soft solids.  When SLP explained that no cough or other concerning symptoms were present, pt began to cough and stated she coughs all the time.  Pt's nurse confirmed that pt had no difficulty swallowing medications today, and that she has not noted her coughing throughout the day.    Risk of aspiration appears low; however, MBSS under fluoroscopy would definitively assess for presence of pharyngeal stasis and determine presence of aspiration.  SLP explained process of MBSS to further investigate pharyngeal phase of swallow and reported stasis.  Pt agreeable to participate, but was very concerned about pain from surgical site, and requested to hold off until Monday 2/17/25.    Recommend continue currently ordered diet and order MBSS for further assessment.    Consistencies Trialed: Thin (IDDSI Level 0) - Straw, Pureed/extremely thick (IDDSI Level 4), Soft & bite sized/chopped (IDDSI Level 6), Regular (IDDSI Level 7)   Oral Motor: Within Functional Limits  Dentition: Poor Dental/Oral Hygiene   Medical Staff Made Aware: Yes      Recommendations:  Solid Diet Recommendations : Soft & bite sized/chopped (IDDSI Level 6)   Liquid Diet Recommendations: Thin (IDDSI Level 0)         Is MBSS Recommended?: Yes, to further assess pharyngeal phase of swallowing    Medication Intake Method: Whole with liquid  Compensatory Swallow Strategies:   - Upright positioning as close to 90º as possible  - Small bites/sips    Recommendation for next level of care: Skilled dysphagia treatment may be warranted at next level of care--to be determined upon further treatment.      Plan  SLP Plan: Skilled SLP   SLP Frequency: " 2x/week  Duration: 3 weeks  Next Treatment Priority: MBSS (pt prefers to wait until MON 2/17/25   Discussed POC: Patient, Nursing   Discussed Risks/Benefits: Yes   Patient/Caregiver Agreeable: Yes     Further treatment in acute setting:  Skilled dysphagia treatment is warranted for completion of MBSS and development of treatment plan pending results.    Dysphagia Goals: (Established 02/13/25, projected discontinuation 3 weeks)    - Pt will safely swallow recommended diet without s/s aspiration for 90% of observed trials in order to maintain adequate nutrition and hydration.  - Pt to participate in assessment of oropharyngeal swallow function via MBSS for assessment of possible aspiration and to determine least restrictive diet to meet nutritional and hydration needs.   - Further goal development to be conducted based on results of MBSS.      Subjective   Pt pleasant and cooperative, upright in bedside chair for assessment.    General Visit Information:  Current Diet : Easy to chew/thin   Prior to Session Communication: Bedside nurse   Self feeding: independent    Objective  Clinical Swallow Evaluation completed consisting of patient/caregiver interview, oral motor assessment, and analysis of swallow abilities with PO trials (2 oz water via cup & straw, puree, soft solids, regular solids.)    ORAL PHASE:   Natural dentition in in poor condition with several missing.     Oral mucosa were darkened and appeared dry, but free of obvious lesions.    Mastication of regular solids was prolonged and effortful with difficult bolus formation and clearance. Mastication with soft solids was easier, with adequate bolus formation,  A-P transport, and oral clearance.    PHARYNGEAL PHASE:   No suspected delay in onset of timeliness of swallow.  Laryngeal movement was visualized with all trials, however adequacy of hyolaryngeal elevation/excursion cannot be determined at bedside.   No overt (immediate or delayed) s/s aspiration were  demonstrated with any consistencies.  Vocal quality clear post all swallows.    Pt reported sensation of foods sticking on the left side of her throat.    Pain:  0-10; 0  Oxygen Status:  Room air    Oral Motor Assessment:  Oral/Motor Assessment  Oral Hygiene: poor  Dentition: Poor Dental/Oral Hygiene  Oral Motor: Within Functional Limits  Labial ROM: Within Functional Limits  Lingual ROM: Within Functional Limits    Inpatient Education:   Individual(s) Educated: Patient  Risk and Benefits Discussed with Patient/Caregiver/Other: yes  Patient/Caregiver Demonstrated Understanding: yes  Plan of Care Discussed and Agreed Upon: yes     Communication with Medical Team:  SLP communicated with bedside nurse prior to evaluation to obtain clearance for patient participation.  Results of the clinical swallow evaluation were discussed verbally with nurse upon completion with verbal understanding noted.

## 2025-02-13 NOTE — ASSESSMENT & PLAN NOTE
Possible sundowning  Continue melatonin   Consider olanzapine if further issues.  Psychiatry Consult

## 2025-02-13 NOTE — DOCUMENTATION CLARIFICATION NOTE
"    PATIENT:               CHRISTIE DONNELLY  ACCT #:                  [id number]  MRN:                       [id number]  :                       1940  ADMIT DATE:       2/10/2025 3:30 PM  DISCH DATE:  RESPONDING PROVIDER #:        [id number]          PROVIDER RESPONSE TEXT:    Metabolic encephalopathy    CDI QUERY TEXT:    Clarification        Instruction:    Based on your assessment of the patient and the clinical information, please provide the requested documentation by clicking on the appropriate radio button and enter any additional information if prompted.    Question: Please further clarify the most likely etiology of the altered mental status as    When answering this query, please exercise your independent professional judgment. The fact that a question is being asked, does not imply that any particular answer is desired or expected.    The patient's clinical indicators include:  Clinical Information: This is a 84-year-old female presenting to the emergency department for mechanical fall.    Clinical Indicators:    : Nurse Note: \"Pt calling out from room. ANDREW Saenz, and I assist pt onto a fracture pan. Pt lifts hips so pan can be placed. Pt grimaces and clutches left hip. Pt almost tearful, asking why would we do this to her. I remind her that she fell on her own at home. She states she knows that and again states she hasn't lost it. Pt clarifies and asks why we would take her daughter and her dog from her. Pt makes some comment about being in long-term. I tell her that she came from the emergency room that I never met her dog and her daughter went home to let the dog out. Pt points out that she can't pee with us hovering, so we give her privacy. A few minutes later pt did void about 400 ml. We ask pt if she wants anything for pain. She tells us she wants nothing from us that she doesn't trust us. Letty suggests our male coworker could administer the pain medicine if she prefers. Pt hesitates, her lip " "quivering, appearing to think it over.\"    02/12: Nurse note: \"Pt awoke to use bedpan and still had same paranoia and delusions that we are keeping her daughter and dog from her\"    02/13: PN: \"Patient seen after surgery with daughter at bedside.  She is somewhat pleasantly confused after going back to the PACU.  No current pain.  Overnight, patient was delirious with paranoid delusions; psychiatry attempted to see the patient today but patient was in surgery.  Discussed sun downing with daughter\"    Treatment: close observation, Psyche consult    Risk Factors: Surgery for hemiarthroplasty, Anesthesia, Fentanyl 25 mcg iv, Morphine  PRN  Options provided:  -- Metabolic encephalopathy  -- Toxic encephalopathy  -- Other - I will add my own diagnosis  -- Refer to Clinical Documentation Reviewer    Query created by: Jaylon Summers on 2/13/2025 11:24 AM      Electronically signed by:  CHER WHITTAKER DO 2/13/2025 3:29 PM          "

## 2025-02-13 NOTE — NURSING NOTE
Took over care of pt at this time. Pt laying in bed, alert, aox2-3. Pt aware of how long she was waiting for surgery, aware she's in a hospital though she struggled to come up with the word, stated it was February, and stated it was 1924. I suspect the wrong century was just a slip of the tongue, so to speak. Pt denies pain and has no needs or complaints at this time. Left hip dressing c/d/intact, area soft, pulse easily palpable. Pt oriented to call bell and it and belongings are placed in reach. NC O2 in place and connected to flowmeter. Bed alarm set. Continuing to monitor.

## 2025-02-13 NOTE — PROGRESS NOTES
Cecelia Chávez is a 84 y.o. female on day 3 of admission presenting with Closed displaced midcervical fracture of left femur.      Subjective   Sitting up in chair. Finishing lunch. Denies pain or complaints.        Objective     Last Recorded Vitals  /57 (BP Location: Left arm, Patient Position: Sitting)   Pulse 60   Temp 36.6 °C (97.9 °F) (Oral)   Resp 18   Wt (!) 44.1 kg (97 lb 3.6 oz)   SpO2 98%   Intake/Output last 3 Shifts:    Intake/Output Summary (Last 24 hours) at 2/13/2025 1345  Last data filed at 2/13/2025 0900  Gross per 24 hour   Intake 1718.67 ml   Output 600 ml   Net 1118.67 ml       Admission Weight  Weight: (!) 43.1 kg (95 lb) (02/10/25 1533)    Daily Weight  02/13/25 : (!) 44.1 kg (97 lb 3.6 oz)    Image Results  XR pelvis 1-2 views  Narrative: Interpreted By:  Camryn Huitron,   STUDY:  XR PELVIS 1-2 VIEWS 2/12/2025 3:55 pm      INDICATION:  Signs/Symptoms:Post op hip      COMPARISON:  02/10/2025      ACCESSION NUMBER(S):  [id number]      ORDERING CLINICIAN:  EKTA AMBROCIO      TECHNIQUE:  AP view of the pelvis      FINDINGS:  There is postoperative change from left hip arthroplasty with  satisfactory positioning of the prosthesis. No acute bony abnormality  is seen.      Impression: Satisfactory postoperative appearance following left hip  hemiarthroplasty.      Signed by: Camryn Huitron 2/12/2025 4:02 PM  Dictation workstation:   [id number]      Physical Exam  Vitals and nursing note reviewed.   Constitutional:       Comments: Frail   HENT:      Head: Normocephalic and atraumatic.      Right Ear: External ear normal.      Left Ear: External ear normal.      Nose: Nose normal.      Mouth/Throat:      Mouth: Mucous membranes are moist.      Comments: Poor dentition   Eyes:      Extraocular Movements: Extraocular movements intact.      Conjunctiva/sclera: Conjunctivae normal.   Cardiovascular:      Rate and Rhythm: Normal rate.      Pulses: Normal pulses.      Heart sounds: Normal heart sounds.    Pulmonary:      Effort: Pulmonary effort is normal.      Breath sounds: Normal breath sounds.   Abdominal:      General: Bowel sounds are normal.      Palpations: Abdomen is soft.   Genitourinary:     Comments: Indwelling danielson catheter draining clear yellow urine  Musculoskeletal:         General: Normal range of motion.      Cervical back: Normal range of motion and neck supple.      Comments: ROM LLE slightly limited due to pain   Otherwise normal ROM    Skin:     General: Skin is warm and dry.      Capillary Refill: Capillary refill takes less than 2 seconds.      Comments: Left hip dressing clean dry intact   Neurological:      Mental Status: She is alert. She is disoriented.   Psychiatric:      Comments: Mildly delirious         Relevant Results               Assessment/Plan        Assessment & Plan  Closed displaced midcervical fracture of left femur  S/p left hip hemiarthroplasty  Pain management   Anticoagulation per orthopedics.  Delirium  Possible sundowning  Continue melatonin   Consider olanzapine if further issues.  Psychiatry Consult  Fall, initial encounter  PT OT   Falls precautions   Anemia  Low stable  Daily CBC            2/13/25: Denies pain or shortness of breath. Does appear to still be with some mild delirium from my standpoint. Will appreciate psychiatry eval.  Patient also complaints of mild, chronic dysphagia over the last 6-12 months. It is worth adding here that I did witness patient swallow cake from her lunch tray without issue. Regardless in the context of this admission in addition to low BMI (17) will obtain Dietician consult to assess overall nutrition status as well as SLP eval to investigate dysphagia. Modified diet in the interim. Add aspiration precautions.          Nina Saenz, APRN-CNP

## 2025-02-13 NOTE — CONSULTS
Nutrition Consult/Progress Note    Consult received for nutritional assessment    See RDN assessment 2/12/25. Patient assessed with severe malnutrition at that time. Nutritional supplements ordered TID. Will continue to follow and monitor nutrition needs.
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
## 2025-02-13 NOTE — PROGRESS NOTES
Occupational Therapy    Evaluation/Treatment    Patient Name: Cecelia Chávez  MRN: [id number]  Department: [address]  Room: [facility]459-  Today's Date: 02/13/25  Time Calculation  Start Time: 0924  Stop Time: 0948  Time Calculation (min): 24 min       Assessment:  OT Assessment: OT order received, chart reviewed, evaluation complete. Pt. demonstrated decreased functional mobility and ADL status due to generalized weakness and impaired balance following L hip hemiarthroplasty. Pt. required max Ax2 for functional mobility and transfer. Pt. would benefit from acute OT services to facilitate return to OF.  Prognosis: Good  Barriers to Discharge Home: Physical needs, Caregiver assistance  Caregiver Assistance: Caregiver assistance needed per identified barriers - however, level of patient's required assistance exceeds assistance available at home  Physical Needs: 24hr mobility assistance needed, 24hr ADL assistance needed, High falls risk due to function or environment, Weight bearing precautions unable to be safely maintained, Ambulating household distances limited by function/safety  Evaluation/Treatment Tolerance: Patient limited by pain  Medical Staff Made Aware: Yes  End of Session Communication: Bedside nurse  End of Session Patient Position: Alarm on, Up in chair (call light in reach)  OT Assessment Results: Decreased ADL status, Decreased functional mobility, Decreased gross motor control, Decreased IADLs, Decreased endurance, Decreased cognition, Decreased safe judgment during ADL  Prognosis: Good  Barriers to Discharge: Decreased caregiver support  Evaluation/Treatment Tolerance: Patient limited by pain  Medical Staff Made Aware: Yes  Strengths: Attitude of self  Barriers to Participation: Comorbidities  Plan:  Treatment Interventions: ADL retraining, Functional transfer training, UE strengthening/ROM, Endurance training, Cognitive reorientation, Patient/family training, Equipment evaluation/education, Compensatory  technique education  OT Frequency: 5 times per week  OT Discharge Recommendations: Moderate intensity level of continued care  Equipment Recommended upon Discharge: Wheeled walker  OT Recommended Transfer Status: Maximum assist  OT - OK to Discharge: Yes  Treatment Interventions: ADL retraining, Functional transfer training, UE strengthening/ROM, Endurance training, Cognitive reorientation, Patient/family training, Equipment evaluation/education, Compensatory technique education    Subjective   Current Problem:  1. Fall, initial encounter        2. Closed displaced midcervical fracture of left femur, initial encounter  Case Request Operating Room: HEMIARTHROPLASTY, HIP    Case Request Operating Room: HEMIARTHROPLASTY, HIP      3. 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 safety and functional mobility  Prior to Session Communication: Bedside nurse  Patient Position Received: Bed, 3 rail up, Alarm off, not on at start of session  General Comment: Pt. is an 84 year old female admit with midcervical fracture of left femur. Pt. has a fall at home and was down for ~ 1 hour before someone found her and helped her up. Pt. did hit her head during fall. Pt. had a hip hemiarthroplastly by Dr. Patel.   Precautions:  Hearing/Visual Limitations: mild [place]  LE Weight Bearing Status: Weight Bearing as Tolerated  Medical Precautions: Fall precautions  Post-Surgical Precautions: Left hip precautions  Precautions Comment: pt. educated on hip precautions        Pain:  Pain Assessment  Pain Assessment: FLACC (Face, Legs, Activity, Cry, Consolability)  0-10 (Numeric) Pain Score: 5 - Moderate pain  Pain Type: Surgical  pain  Pain Interventions: Repositioned  Response to Interventions: Content/relaxed    Objective   Cognition:  Overall Cognitive Status: Impaired (pt. appears paranoid and perseverating on daughter, required frequent redirection)  Orientation Level: Oriented X4  Insight: Mild  Impulsive: Mildly  Processing Speed: Delayed           Home Living:  Type of Home: House  Lives With: Adult children  Home Adaptive Equipment: Walker rolling or standard, Wheelchair-manual  Home Layout: One level  Home Access: Stairs to enter without rails  Entrance Stairs-Number of Steps: 3  Bathroom Shower/Tub: Tub/shower unit  Bathroom Equipment: Shower chair with back  Bathroom Accessibility: pt. reports she has not used tub shower due to fear of falling. she reports using sink and sponge bathing.  Home Living Comments: pt. lives with daughter but daughter works full time and unable to be present during the work day  Prior Function:  Level of [place]: Needs assistance with ADLs, Needs assistance with homemaking, Needs assistance with functional transfers  Receives Help From: Family  ADL Assistance:  (needs assistance)  Homemaking Assistance:  (needs assistance)  Ambulatory Assistance:  (needs assistance)  Prior Function Comments: Pt. reports she uses mainly a rolling walker for functional mobility but also has access to a wheelchair. Pt. is dependent on adult children for transportation and assist with ADLs     ADL:  Eating Assistance: Independent  Grooming Assistance: Independent  Bathing Assistance: Maximal  Bathing Deficit: Verbal cueing, Supervision/safety, Increased time to complete , Perineal area, Buttocks, Right upper leg, Left upper leg, Right lower leg including foot, Left lower leg including foot (Pt. reports having a fear of falling and is projected to need assistance accessing LE due to hip precautions. PCA completed sponge bath prior to session with max assist)  UE Dressing Assistance: Minimal  UE Dressing Deficit: Verbal  cueing, Supervision/safety, Increased time to complete (pt. has an increased risk of falling and is projected to require close supervision for balance and stability)  LE Dressing Assistance: Maximal  LE Dressing Deficit: Verbal cueing, Supervision/safety, Increased time to complete, Tie shoes, Don/doff R sock, Don/doff L sock, Thread RLE into pants, Thread LLE into pants, Thread RLE into underwear, Thread LLE into underwear, Pull up over hips, Fasteners, Don/doff R shoe, Don/doff L shoe (Pt. appears to have a high pain level and is projected to have difficulty weight shifting in sitting to thread LE through clothing items and maintaining balance while in standing)  Toileting Assistance with Device: Moderate  Toileting Deficit: Verbal cueing, Supervison/safety, Increased time to complete, Clothing management up, Clothing management down, Perineal hygiene, Grab bar use (pt. is projected to require assistance with clothing management due to limited stability following hip hemiarthroplasty)    Activity Tolerance:  Endurance: Decreased tolerance for upright activites  Activity Tolerance Comments: pt. limited by pain  Rate of Perceived Exertion (RPE): 6     Bed Mobility/Transfers: Bed Mobility  Bed Mobility: Yes  Bed Mobility 1  Bed Mobility 1: Supine to sitting  Level of Assistance 1: Maximum assistance, +2  Bed Mobility Comments 1: pt. required max Ax2 for assistance with elevating trunk from bed and elevating legs over EOB while avoiding crossing over of legs. draw sheet utilized for scoot to EOB    Transfers  Transfer: Yes  Transfer 1  Transfer From 1: Bed to, Stand to  Transfer to 1: Stand, Chair with arms  Technique 1: Sit to stand, Stand to sit  Transfer Device 1: Walker  Transfer Level of Assistance 1: Maximum assistance, +2  Trials/Comments 1: Pt. required Max Ax2 as well as frequent verbal and tactile cues for controlled descent into chair,  aligning LE into chair, and hand placement onto chair.    Functional  Mobility:  Functional Mobility  Functional Mobility Performed: Yes  Functional Mobility 1  Surface 1: Level tile  Device 1: Rolling walker  Assistance 1: Maximum assistance (x2)  Quality of Functional Mobility 1: Narrow base of support  Comments 1: Pt. required Max Ax2  for elevation from surface, rolling walker management, and support for balance and stability. Frequent verbal and tactile cues were provided for weight shifting while taking steps toward chair.     Sensation:  Light Touch: Severe deficits in the RLE, Severe deficits in the LLE (pt. states the numbness in LE is not new)     Coordination:  Movements are Fluid and Coordinated: No   Hand Function:  Hand Function  Gross Grasp: Functional  Extremities: RUE   RUE : Within Functional Limits and LUE   LUE: Within Functional Limits    Outcome Measures: [facility] Daily Activity  Putting on and taking off regular lower body clothing: Total  Bathing (including washing, rinsing, drying): Total  Putting on and taking off regular upper body clothing: A little  Toileting, which includes using toilet, bedpan or urinal: Total  Taking care of personal grooming such as brushing teeth: A little  Eating Meals: None  Daily Activity - Total Score: 13    Education Documentation  ADL Training, taught by LILI Hanson at 2/13/2025 10:42 AM.  Learner: Patient  Readiness: Acceptance  Method: Explanation  Response: Verbalizes Understanding  Comment: Pt. educated on hip precautions and plan for therapy.    Education Comments  No comments found.        OP EDUCATION:       Goals:  Encounter Problems       Encounter Problems (Active)       OT Goals       Functional Transfer (Progressing)       Start:  02/13/25    Expected End:  02/28/25       Patient will complete functional transfer with modified independence utilizing rolling walker.          Functional Mobility  (Progressing)       Start:  02/13/25    Expected End:  02/28/25       Patient will complete household distance  functional mobility with modified independence utilizing rolling walker.          ADLs (Progressing)       Start:  02/13/25    Expected End:  02/28/25       Patient will complete ADLs with min assistance utilizing AE as needed.

## 2025-02-14 ENCOUNTER — APPOINTMENT (OUTPATIENT)
Dept: RADIOLOGY | Facility: HOSPITAL | Age: 85
DRG: 521 | End: 2025-02-14
Payer: MEDICARE

## 2025-02-14 LAB
ALBUMIN SERPL BCP-MCNC: 2.8 G/DL (ref 3.4–5)
ANION GAP SERPL CALCULATED.3IONS-SCNC: 11 MMOL/L (ref 10–20)
BASOPHILS # BLD AUTO: 0.02 X10*3/UL (ref 0–0.1)
BASOPHILS NFR BLD AUTO: 0.3 %
BUN SERPL-MCNC: 29 MG/DL (ref 6–23)
CALCIUM SERPL-MCNC: 8.5 MG/DL (ref 8.6–10.3)
CHLORIDE SERPL-SCNC: 107 MMOL/L (ref 98–107)
CO2 SERPL-SCNC: 26 MMOL/L (ref 21–32)
CREAT SERPL-MCNC: 1.19 MG/DL (ref 0.5–1.05)
EGFRCR SERPLBLD CKD-EPI 2021: 46 ML/MIN/1.73M*2
EOSINOPHIL # BLD AUTO: 0.1 X10*3/UL (ref 0–0.4)
EOSINOPHIL NFR BLD AUTO: 1.3 %
ERYTHROCYTE [DISTWIDTH] IN BLOOD BY AUTOMATED COUNT: 13 % (ref 11.5–14.5)
GLUCOSE SERPL-MCNC: 102 MG/DL (ref 74–99)
HCT VFR BLD AUTO: 28.1 % (ref 36–46)
HGB BLD-MCNC: 9.2 G/DL (ref 12–16)
IMM GRANULOCYTES # BLD AUTO: 0.03 X10*3/UL (ref 0–0.5)
IMM GRANULOCYTES NFR BLD AUTO: 0.4 % (ref 0–0.9)
LYMPHOCYTES # BLD AUTO: 1.19 X10*3/UL (ref 0.8–3)
LYMPHOCYTES NFR BLD AUTO: 15.8 %
MCH RBC QN AUTO: 33.2 PG (ref 26–34)
MCHC RBC AUTO-ENTMCNC: 32.7 G/DL (ref 32–36)
MCV RBC AUTO: 101 FL (ref 80–100)
MONOCYTES # BLD AUTO: 0.61 X10*3/UL (ref 0.05–0.8)
MONOCYTES NFR BLD AUTO: 8.1 %
NEUTROPHILS # BLD AUTO: 5.56 X10*3/UL (ref 1.6–5.5)
NEUTROPHILS NFR BLD AUTO: 74.1 %
NRBC BLD-RTO: 0 /100 WBCS (ref 0–0)
PHOSPHATE SERPL-MCNC: 3.7 MG/DL (ref 2.5–4.9)
PLATELET # BLD AUTO: 170 X10*3/UL (ref 150–450)
POTASSIUM SERPL-SCNC: 3.6 MMOL/L (ref 3.5–5.3)
RBC # BLD AUTO: 2.77 X10*6/UL (ref 4–5.2)
SODIUM SERPL-SCNC: 140 MMOL/L (ref 136–145)
WBC # BLD AUTO: 7.5 X10*3/UL (ref 4.4–11.3)

## 2025-02-14 PROCEDURE — 80069 RENAL FUNCTION PANEL: CPT | Performed by: INTERNAL MEDICINE

## 2025-02-14 PROCEDURE — 2500000005 HC RX 250 GENERAL PHARMACY W/O HCPCS

## 2025-02-14 PROCEDURE — 2500000005 HC RX 250 GENERAL PHARMACY W/O HCPCS: Performed by: INTERNAL MEDICINE

## 2025-02-14 PROCEDURE — 97530 THERAPEUTIC ACTIVITIES: CPT | Mod: GO,CO

## 2025-02-14 PROCEDURE — 2500000001 HC RX 250 WO HCPCS SELF ADMINISTERED DRUGS (ALT 637 FOR MEDICARE OP)

## 2025-02-14 PROCEDURE — 97110 THERAPEUTIC EXERCISES: CPT | Mod: GO,CO

## 2025-02-14 PROCEDURE — 97110 THERAPEUTIC EXERCISES: CPT | Mod: GP,CQ

## 2025-02-14 PROCEDURE — 99231 SBSQ HOSP IP/OBS SF/LOW 25: CPT

## 2025-02-14 PROCEDURE — 97530 THERAPEUTIC ACTIVITIES: CPT | Mod: GP,CQ

## 2025-02-14 PROCEDURE — 99232 SBSQ HOSP IP/OBS MODERATE 35: CPT

## 2025-02-14 PROCEDURE — 2500000001 HC RX 250 WO HCPCS SELF ADMINISTERED DRUGS (ALT 637 FOR MEDICARE OP): Performed by: INTERNAL MEDICINE

## 2025-02-14 PROCEDURE — 2500000004 HC RX 250 GENERAL PHARMACY W/ HCPCS (ALT 636 FOR OP/ED)

## 2025-02-14 PROCEDURE — 2500000002 HC RX 250 W HCPCS SELF ADMINISTERED DRUGS (ALT 637 FOR MEDICARE OP, ALT 636 FOR OP/ED)

## 2025-02-14 PROCEDURE — 36415 COLL VENOUS BLD VENIPUNCTURE: CPT | Performed by: INTERNAL MEDICINE

## 2025-02-14 PROCEDURE — 85025 COMPLETE CBC W/AUTO DIFF WBC: CPT | Performed by: INTERNAL MEDICINE

## 2025-02-14 PROCEDURE — 2500000004 HC RX 250 GENERAL PHARMACY W/ HCPCS (ALT 636 FOR OP/ED): Performed by: INTERNAL MEDICINE

## 2025-02-14 PROCEDURE — 1100000001 HC PRIVATE ROOM DAILY

## 2025-02-14 RX ORDER — SODIUM CHLORIDE AND POTASSIUM CHLORIDE 150; 900 MG/100ML; MG/100ML
50 INJECTION, SOLUTION INTRAVENOUS CONTINUOUS
Status: DISCONTINUED | OUTPATIENT
Start: 2025-02-14 | End: 2025-02-15

## 2025-02-14 RX ADMIN — MORPHINE SULFATE 4 MG: 4 INJECTION, SOLUTION INTRAMUSCULAR; INTRAVENOUS at 03:40

## 2025-02-14 RX ADMIN — ASPIRIN 325 MG: 325 TABLET, COATED ORAL at 10:10

## 2025-02-14 RX ADMIN — OLANZAPINE 5 MG: 5 TABLET, ORALLY DISINTEGRATING ORAL at 20:06

## 2025-02-14 RX ADMIN — ALLOPURINOL 300 MG: 300 TABLET ORAL at 10:17

## 2025-02-14 RX ADMIN — Medication 3 MG: at 20:06

## 2025-02-14 RX ADMIN — LIDOCAINE 1 PATCH: 4 PATCH TOPICAL at 20:07

## 2025-02-14 RX ADMIN — DOCUSATE SODIUM 100 MG: 100 CAPSULE, LIQUID FILLED ORAL at 10:10

## 2025-02-14 RX ADMIN — PANTOPRAZOLE SODIUM 40 MG: 40 TABLET, DELAYED RELEASE ORAL at 06:12

## 2025-02-14 RX ADMIN — MORPHINE SULFATE 4 MG: 4 INJECTION, SOLUTION INTRAMUSCULAR; INTRAVENOUS at 23:47

## 2025-02-14 RX ADMIN — METOPROLOL SUCCINATE 50 MG: 50 TABLET, EXTENDED RELEASE ORAL at 10:11

## 2025-02-14 RX ADMIN — ASPIRIN 325 MG: 325 TABLET, COATED ORAL at 20:06

## 2025-02-14 RX ADMIN — GABAPENTIN 300 MG: 300 CAPSULE ORAL at 10:10

## 2025-02-14 RX ADMIN — CLOTRIMAZOLE: 10 CREAM TOPICAL at 20:08

## 2025-02-14 RX ADMIN — SODIUM CHLORIDE AND POTASSIUM CHLORIDE 50 ML/HR: .9; .15 SOLUTION INTRAVENOUS at 13:33

## 2025-02-14 RX ADMIN — ACETAMINOPHEN 975 MG: 325 TABLET, FILM COATED ORAL at 10:11

## 2025-02-14 RX ADMIN — DOCUSATE SODIUM 100 MG: 100 CAPSULE, LIQUID FILLED ORAL at 20:06

## 2025-02-14 ASSESSMENT — COGNITIVE AND FUNCTIONAL STATUS - GENERAL
MOBILITY SCORE: 14
STANDING UP FROM CHAIR USING ARMS: A LOT
DRESSING REGULAR LOWER BODY CLOTHING: TOTAL
TOILETING: A LITTLE
MOVING TO AND FROM BED TO CHAIR: A LOT
CLIMB 3 TO 5 STEPS WITH RAILING: TOTAL
PERSONAL GROOMING: A LITTLE
CLIMB 3 TO 5 STEPS WITH RAILING: TOTAL
MOVING FROM LYING ON BACK TO SITTING ON SIDE OF FLAT BED WITH BEDRAILS: A LOT
TURNING FROM BACK TO SIDE WHILE IN FLAT BAD: A LOT
TOILETING: TOTAL
MOBILITY SCORE: 11
HELP NEEDED FOR BATHING: A LOT
DRESSING REGULAR UPPER BODY CLOTHING: A LITTLE
STANDING UP FROM CHAIR USING ARMS: A LOT
WALKING IN HOSPITAL ROOM: A LOT
HELP NEEDED FOR BATHING: A LOT
DAILY ACTIVITIY SCORE: 18
DRESSING REGULAR UPPER BODY CLOTHING: A LITTLE
TURNING FROM BACK TO SIDE WHILE IN FLAT BAD: A LITTLE
DAILY ACTIVITIY SCORE: 14
DRESSING REGULAR LOWER BODY CLOTHING: A LOT
WALKING IN HOSPITAL ROOM: A LOT
MOVING TO AND FROM BED TO CHAIR: A LOT

## 2025-02-14 ASSESSMENT — PAIN SCALES - WONG BAKER: WONGBAKER_NUMERICALRESPONSE: NO HURT

## 2025-02-14 ASSESSMENT — PAIN DESCRIPTION - DESCRIPTORS
DESCRIPTORS: ACHING
DESCRIPTORS: SHOOTING

## 2025-02-14 ASSESSMENT — PAIN - FUNCTIONAL ASSESSMENT
PAIN_FUNCTIONAL_ASSESSMENT: 0-10
PAIN_FUNCTIONAL_ASSESSMENT: FLACC (FACE, LEGS, ACTIVITY, CRY, CONSOLABILITY)
PAIN_FUNCTIONAL_ASSESSMENT: WONG-BAKER FACES

## 2025-02-14 ASSESSMENT — PAIN SCALES - GENERAL
PAINLEVEL_OUTOF10: 9
PAINLEVEL_OUTOF10: 10 - WORST POSSIBLE PAIN
PAINLEVEL_OUTOF10: 6
PAINLEVEL_OUTOF10: 6

## 2025-02-14 NOTE — PROGRESS NOTES
Speech-Language Pathology                 Therapy Communication Note    Patient Name: Cecelia Chávez  MRN: [id number]  Department: [address]  Room: [address] [place] [address]  Today's Date: 2/14/2025     Discipline: Speech Language Pathology          Missed Visit Reason:  Pt refusing MBSS she states she is too tired and too much is going on, SLP encouraged pt to participate d/t her c/o dysphagia, she continued to refuse, agreed to possibly complete on Monday 2/17, which is the next day MBSS can be rescheduled, discussed with RN    Missed Time: Cancel    Comment:

## 2025-02-14 NOTE — PROGRESS NOTES
Cecelia Chávez is a 84 y.o. female on day 4 of admission presenting with Closed displaced midcervical fracture of left femur.      Subjective   Laying in bed resting quietly. Denies acute overnight events. Appears with improved mentation this AM. Denies pain or complaints at the moment         Objective     Last Recorded Vitals  /62 (BP Location: Left arm, Patient Position: Lying)   Pulse 70   Temp 36.7 °C (98.1 °F) (Temporal)   Resp 18   Wt 47 kg (103 lb 9.9 oz)   SpO2 100%   Intake/Output last 3 Shifts:    Intake/Output Summary (Last 24 hours) at 2/14/2025 1051  Last data filed at 2/14/2025 0900  Gross per 24 hour   Intake 680 ml   Output 675 ml   Net 5 ml       Admission Weight  Weight: (!) 43.1 kg (95 lb) (02/10/25 1533)    Daily Weight  02/14/25 : 47 kg (103 lb 9.9 oz)    Image Results  XR pelvis 1-2 views  Narrative: Interpreted By:  Camryn Huitron,   STUDY:  XR PELVIS 1-2 VIEWS 2/12/2025 3:55 pm      INDICATION:  Signs/Symptoms:Post op hip      COMPARISON:  02/10/2025      ACCESSION NUMBER(S):  [id number]      ORDERING CLINICIAN:  EKTA AMBROCIO      TECHNIQUE:  AP view of the pelvis      FINDINGS:  There is postoperative change from left hip arthroplasty with  satisfactory positioning of the prosthesis. No acute bony abnormality  is seen.      Impression: Satisfactory postoperative appearance following left hip  hemiarthroplasty.      Signed by: Camryn Huitron 2/12/2025 4:02 PM  Dictation workstation:   [id number]      Physical Exam  Vitals and nursing note reviewed.   Constitutional:       Comments: Frail   HENT:      Head: Normocephalic and atraumatic.      Right Ear: External ear normal.      Left Ear: External ear normal.      Nose: Nose normal.      Mouth/Throat:      Mouth: Mucous membranes are moist.      Comments: Poor dentition   Eyes:      Extraocular Movements: Extraocular movements intact.      Conjunctiva/sclera: Conjunctivae normal.   Cardiovascular:      Rate and Rhythm: Normal rate.       Pulses: Normal pulses.      Heart sounds: Normal heart sounds.   Pulmonary:      Effort: Pulmonary effort is normal.      Breath sounds: Normal breath sounds.   Abdominal:      General: Bowel sounds are normal.      Palpations: Abdomen is soft.   Genitourinary:     Comments: Indwelling danielson catheter draining clear yellow urine  Musculoskeletal:         General: Normal range of motion.      Cervical back: Normal range of motion and neck supple.      Comments: ROM LLE slightly limited due to pain   Otherwise normal ROM    Skin:     General: Skin is warm and dry.      Capillary Refill: Capillary refill takes less than 2 seconds.      Comments: Left hip dressing clean dry intact   Neurological:      Mental Status: She is alert. She is disoriented.      Comments: Improved mentation from yesterday    Psychiatric:      Comments: Delirium improving today          Relevant Results               Assessment/Plan        Assessment & Plan  Closed displaced midcervical fracture of left femur  S/p left hip hemiarthroplasty  Pain management   Anticoagulation per orthopedics.  Delirium  Possible sundowning  Continue melatonin   Consider olanzapine if further issues.  Psychiatry Consult  Fall, initial encounter  PT OT   Falls precautions   Anemia  Low stable  Daily CBC    Elevated Cr  Mildly elevated from admission  Gentle IVF            2/13/25: Denies pain or shortness of breath. Does appear to still be with some mild delirium from my standpoint. Will appreciate psychiatry eval.  Patient also complaints of mild, chronic dysphagia over the last 6-12 months. It is worth adding here that I did witness patient swallow cake from her lunch tray without issue. Regardless in the context of this admission in addition to low BMI (17) will obtain Dietician consult to assess overall nutrition status as well as SLP eval to investigate dysphagia. Modified diet in the interim. Add aspiration precautions.     2/14/25: Appreciate improved  mentation today. Denies pain or complaints. Noted SLP eval yesterday; recommending MBSS to assess swallowing. Also noted mildly elevated renal function will start gentle IVF. SNF placement pending.          Nina Saenz, RONNY-CNP

## 2025-02-14 NOTE — PROGRESS NOTES
[facility], she provided choice of [facility], [facility] advised there is the chance this facility may not have bed availability and that it would be a good idea to provide more choices, SNF list sent to Pa     02/14/25 3707   Discharge Planning   Expected Discharge Disposition SNF

## 2025-02-14 NOTE — NURSING NOTE
Message sent to Letty Wood APRN-CNP, notifying provider, patient has not had any urine output since this mornings straight cath, and that This RN  bladder scanned patient again and she is at 232ml in bladder.     Provider placed order to place a danielson catheter.

## 2025-02-14 NOTE — NURSING NOTE
Message sent to provider Nina Saenz, requesting parameters placed on patient's metoprolol. Notifying  BP this am was 107/62. Also just FYI, BUN 29 creat, 1.19 today

## 2025-02-14 NOTE — PROGRESS NOTES
Physical Therapy    Physical Therapy Treatment    Patient Name: Cecelia Chávez  MRN: [id number]  Department: [address]  Room: [address] [place] [address]-  Today's Date: 2/14/2025  Time Calculation  Start Time: 0949  Stop Time: 1012  Time Calculation (min): 23 min         Assessment/Plan   PT Assessment  Barriers to Discharge Home: Caregiver assistance, Physical needs  Caregiver Assistance: Caregiver assistance needed per identified barriers - however, level of patient's required assistance exceeds assistance available at home  End of Session Communication: Bedside nurse  Assessment Comment: Pt anxious and fearful of falling during transfer training. Pt continues to present with significant strength and mobility impairements requiring assist x2 for all functional mobility.  End of Session Patient Position: Up in chair, Alarm on  PT Plan  Inpatient/Swing Bed or Outpatient: Inpatient  PT Plan  Treatment/Interventions: Bed mobility, Transfer training, Gait training, Stair training, Balance training, Strengthening, Endurance training, Range of motion, Therapeutic exercise, Therapeutic activity, Home exercise program, Positioning, Postural re-education  PT Plan: Ongoing PT  PT Frequency: Daily  PT Discharge Recommendations: Moderate intensity level of continued care  Equipment Recommended upon Discharge: Wheeled walker  PT Recommended Transfer Status: Assist x1, Assistive device (RW)  PT - OK to Discharge: Yes (PT POC initiated)      General Visit Information:   PT  Visit  PT Received On: 02/14/25  General  Prior to Session Communication: Bedside nurse  Patient Position Received: Bed, 3 rail up, Alarm on  General Comment: Cleared by nursing to be seen for therapy, pt agreeable with tx, supine in bed upon arrival.    Subjective   Precautions:  Precautions  LE Weight Bearing Status: Weight Bearing as Tolerated  Medical Precautions: Fall precautions  Post-Surgical Precautions: Left hip precautions  Precautions Comment: educated on KEZIA  precautions    Objective   Pain:  Pain Assessment  Pain Assessment: 0-10  0-10 (Numeric) Pain Score: 6  Pain Type: Surgical pain  Pain Location: Hip  Pain Orientation: Left  Pain Interventions: Repositioned  Response to Interventions: Content/relaxed, Provider notified (Rn aware and in to give medications.)    Cognition:  Cognition  Overall Cognitive Status: Within Functional Limits  Orientation Level: Oriented X4  Following Commands: Follows one step commands with repetition     Postural Control:  Static Sitting Balance  Static Sitting-Balance Support: Bilateral upper extremity supported, Feet supported  Static Sitting-Level of Assistance: Close supervision  Static Sitting-Comment/Number of Minutes: Fair seated static balance.  Static Standing Balance  Static Standing-Balance Support: Bilateral upper extremity supported  Static Standing-Level of Assistance: Maximum assistance  Static Standing-Comment/Number of Minutes: Poor static standing balance with bilateral UE support on walker requiring max assist x 2    Treatments:  Therapeutic Exercise  Therapeutic Exercise Performed: Yes  Therapeutic Exercise Activity 1: Bilateral ankle pumps x15  Therapeutic Exercise Activity 2: Bilateral hip flexion x15 (Assisted on left)  Therapeutic Exercise Activity 3: Bilateral knee extension x15    Bed Mobility  Bed Mobility: Yes  Bed Mobility 1  Bed Mobility 1: Supine to sitting  Level of Assistance 1: Moderate assistance, +2, Moderate verbal cues  Bed Mobility Comments 1: Mod assist x2 for trunk/bilateral LE's during supine to sit, max assist to scoot EOB with use of draw sheet.    Ambulation/Gait Training  Ambulation/Gait Training Performed: Yes  Ambulation/Gait Training 1  Surface 1: Level tile  Device 1: Rolling walker  Assistance 1: Maximum assistance  Quality of Gait 1: Narrow base of support, Diminished heel strike, Decreased step length, Soft knee(s)  Comments/Distance (ft) 1: 3-4 pivoting steps (bed to chair) with wheeled  walker, requires assist to maneuver walker, difficulty advancing LE's, max assist x2 for balance.    Transfers  Transfer: Yes  Transfer 1  Transfer From 1: Sit to  Transfer to 1: Stand  Technique 1: Sit to stand, Stand to sit  Transfer Device 1: Walker  Transfer Level of Assistance 1: Moderate assistance, +2, Minimal verbal cues  Trials/Comments 1: Mod assist x2 for trunk up during sit to stand, poor static standing balance, pt pulling up from walker to stand, decreased eccentric control in sitting    Outcome Measures:  [facility] Basic Mobility  Turning from your back to your side while in a flat bed without using bedrails: A lot  Moving from lying on your back to sitting on the side of a flat bed without using bedrails: A lot  Moving to and from bed to chair (including a wheelchair): A lot  Standing up from a chair using your arms (e.g. wheelchair or bedside chair): A lot  To walk in hospital room: A lot  Climbing 3-5 steps with railing: Total  Basic Mobility - Total Score: 11    Education Documentation  Precautions, taught by Amita Shannon PTA at 2/14/2025 11:13 AM.  Learner: Patient  Readiness: Acceptance  Method: Explanation  Response: Verbalizes Understanding    Body Mechanics, taught by Amita Shannon PTA at 2/14/2025 11:13 AM.  Learner: Patient  Readiness: Acceptance  Method: Explanation  Response: Verbalizes Understanding    Mobility Training, taught by Amita Shannon PTA at 2/14/2025 11:13 AM.  Learner: Patient  Readiness: Acceptance  Method: Explanation  Response: Verbalizes Understanding    Education Comments  02/14/25 1114 Amita Shannon PTA  Patient educated on the importance of mobility and participation with therapy. Educated on safety and use of call light for assistance.           Encounter Problems       Encounter Problems (Active)       Mobility       STG - Patient will ambulate 30' with use of RW and min A, maintaining L LE hip precautions, WBAT (Progressing)       Start:  02/13/25    Expected End:   02/27/25            STG - Patient will ascend and descend 3 steps with pascale handrails, step-to pattern, WBAT, maintaining L LE hip precautions  (Not Progressing)       Start:  02/13/25    Expected End:  02/27/25               PT Transfers       STG - Patient will perform bed mobility with min A  (Progressing)       Start:  02/13/25    Expected End:  02/27/25            STG - Patient will transfer sit to and from stand with min A, maintaining L LE hip precautions, WBAT  (Progressing)       Start:  02/13/25    Expected End:  02/27/25               Pain - Adult          Safety       LTG - Patient will adhere to hip precautions during ADL's and transfers (Progressing)       Start:  02/13/25    Expected End:  02/27/25

## 2025-02-14 NOTE — PROGRESS NOTES
Occupational Therapy    OT Treatment    Patient Name: Cecelia Chávez  MRN: [id number]  Department: [address]  Room: [facility]459-  Today's Date: 2/14/2025  Time Calculation  Start Time: 0942  Stop Time: 1005  Time Calculation (min): 23 min        Assessment:  OT Assessment: Tolerated session fairly with increased time + effort required for all functional tasks this date d/t pain. Pt would benefit from continued skilled OT services to improve strength, balance, and functional tolerance to increase independence with ADL tasks  Barriers to Discharge Home: Physical needs, Caregiver assistance  End of Session Communication: Bedside nurse  End of Session Patient Position: Up in chair, Alarm on  OT Assessment Results: Decreased ADL status, Decreased functional mobility, Decreased gross motor control, Decreased IADLs, Decreased endurance, Decreased cognition, Decreased safe judgment during ADL  Plan:  Treatment Interventions: ADL retraining, Functional transfer training, UE strengthening/ROM, Endurance training, Cognitive reorientation, Patient/family training, Equipment evaluation/education, Compensatory technique education  OT Frequency: 5 times per week  OT Discharge Recommendations: Moderate intensity level of continued care  Equipment Recommended upon Discharge: Wheeled walker  OT Recommended Transfer Status: Maximum assist  OT - OK to Discharge: Yes  Treatment Interventions: ADL retraining, Functional transfer training, UE strengthening/ROM, Endurance training, Cognitive reorientation, Patient/family training, Equipment evaluation/education, Compensatory technique education    Subjective   Previous Visit Info:  OT Last Visit  OT Received On: 02/14/25  General:  General  Reason for Referral: Pt. is an 84 year old female admit with midcervical fracture of left femur. Pt. has a fall at home and was down for ~ 1 hour before someone found her and helped her up. Pt. did hit her head during fall. Pt. had a hip hemiarthroplastly by   Jorge.  Prior to Session Communication: Bedside nurse  Patient Position Received: Bed, 3 rail up, Alarm on  General Comment: Cleared for therapy per RN. Pt supine in bed upon arrival and agreeable to tx  Precautions:  Hearing/Visual Limitations: mild [place]  LE Weight Bearing Status: Weight Bearing as Tolerated  Medical Precautions: Fall precautions  Post-Surgical Precautions: Left hip precautions  Precautions Comment: educated on KEZIA precautions    Pain:  Pain Assessment  Pain Assessment: FLACC (Face, Legs, Activity, Cry, Consolability)  0-10 (Numeric) Pain Score: 6  Pain Type: Surgical pain  Pain Location: Hip  Pain Orientation: Left  Pain Interventions: Ambulation/increased activity, Repositioned (RN aware)  Response to Interventions: Decrease in pain    Objective    Cognition:  Cognition  Orientation Level: Oriented X4  Following Commands: Follows one step commands with repetition  Safety Judgment: Decreased awareness of need for safety precautions  Insight: Moderate  Processing Speed: Delayed    Activities of Daily Living:    Grooming  Grooming Comments: declining engagement in grooming tasks at this time despite encouragement    Bed Mobility/Transfers: Bed Mobility  Bed Mobility: Yes  Bed Mobility 1  Bed Mobility 1: Supine to sitting  Level of Assistance 1: Moderate assistance, +2, Moderate verbal cues  Bed Mobility Comments 1: assist with BLE advancement and trunk elevation with cues to scoot hips to EOB with increased time + effort required to complete supine>sit. Additional assist required to scoot hips with use of drawsheet    Transfers  Transfer: Yes  Transfer 1  Technique 1: Sit to stand, Stand to sit  Transfer Device 1: Walker  Transfer Level of Assistance 1: Moderate assistance, +2, Minimal verbal cues  Trials/Comments 1: assist for trunk elevation with cues for proper hand placement and eccentric lowering to chair    Functional Mobility:  Functional Mobility  Functional Mobility Performed:  Yes  Functional Mobility 1  Device 1: Rolling walker  Assistance 1: Moderate assistance, Moderate verbal cues (x2)  Comments 1: tolerated taking steps to chair at FWW with cues for step/walker sequencing and postural alignment with increased time + effort required for task completion    Standing Balance:  Static Standing Balance  Static Standing-Level of Assistance: Moderate assistance (x2)  Static Standing-Comment/Number of Minutes: fair- balance during functional mobility task to chair    Therapy/Activity: Therapeutic Exercise  Therapeutic Exercise Performed: Yes  Therapeutic Exercise Activity 1: participated in AROM BUE exercises 4x10 in all planes to improve strength and functional tolerance to increase independence with transfer tasks with cues provided for proper muscle recruitement    Outcome Measures:[facility] Daily Activity  Putting on and taking off regular lower body clothing: Total  Bathing (including washing, rinsing, drying): A lot  Putting on and taking off regular upper body clothing: A little  Toileting, which includes using toilet, bedpan or urinal: Total  Taking care of personal grooming such as brushing teeth: A little  Eating Meals: None  Daily Activity - Total Score: 14    Education Documentation  ADL Training, taught by JOHN Norton at 2/14/2025 10:58 AM.  Learner: Patient  Readiness: Acceptance  Method: Explanation  Response: Verbalizes Understanding, Needs Reinforcement  Comment: educated on hip precautions + importance of safety    Education Comments  No comments found.      Problem: OT Goals  Goal: Functional Transfer  Description: Patient will complete functional transfer with modified independence utilizing rolling walker.   Outcome: Progressing  Goal: Functional Mobility   Description: Patient will complete household distance functional mobility with modified independence utilizing rolling walker.   Outcome: Progressing  Goal: ADLs  Description: Patient will complete ADLs  with min assistance utilizing AE as needed.   Outcome: Progressing

## 2025-02-14 NOTE — CARE PLAN
The patient's goals for the shift include  rest    The clinical goals for the shift include safety, urinate independently, pain management.

## 2025-02-14 NOTE — PROGRESS NOTES
"Cecelia Chávez is a 84 y.o. female on day 4 of admission presenting with Closed displaced midcervical fracture of left femur.      Subjective   Patient’s chart was reviewed, and case was discussed with nursing staff. Nursing reported that patient is doing well, with no signs of agitation or combative behavior. She slept well the previous night.  Patient was seen in her assigned room, sitting in a chair. She reported feeling tired due to interruptions during the night for medical care, which prevented her from getting adequate rest. She expressed frustration about her fall, stating that she has been an active person throughout her life and regrets the incident that led to her hospitalization. Patient denied any current suicidal or homicidal ideation, as well as auditory or visual hallucinations. She reported eating well and described her mood as “ok.”     Objective     Last Recorded Vitals  Blood pressure 102/54, pulse 73, temperature 36.9 °C (98.4 °F), temperature source Temporal, resp. rate 16, height 1.575 m (5' 2\"), weight 47 kg (103 lb 9.9 oz), SpO2 96%.    Review of Systems    Psychiatric ROS - Adult  Anxiety: General Anxiety Disorder (CELIA)CELIA Behaviors: difficult to control worry, excessive anxiety/worry, difficulty concentrating, easily fatigued, restlessness, and sleep disturbance and Post Traumatic Stress Disorder (PTSD)PTSD: traumatic event and hypervigilance  Depression: concentration and energy  Delirium: acute inattention, disorientation, sleep-wake abnormal, and waxing and waning- appears to be improving   Psychosis: negative  Collette: negative  Safety Issues: none  Psychiatric ROS Comment: As noted     Physical Exam    Mental Status Exam  General Appearance: Appeared as age stated; appropriately dressed/groomed, seated on chair comfortably during interview.  Attitude:  calm   Behavior: Fair EC; overall responding appropriately  Motor Activity: No notable rika PMAR  Speech:  clear  Mood: \"ok\"  Affect:  " mood congruent  Thought Process:  logical  Thought Content: Denied SI/HI. Not voicing/endorsing delusions.  Thought Perception: Did not appear to be responding to internal stimuli. Not endorsing AVH  Cognition: Grossly intact; A&O x4/4 to self, place, date, and context.  Insight: Fair, as evidenced by awareness of her condition   Judgement: Fair      Psychiatric Risk Assessment  Violence Risk Assessment: none  Acute Risk of Harm to Others is Considered: low   Suicide Risk Assessment: age > 65 yrs old, , history of trauma or abuse, and unmarried  Protective Factors against Suicide: adherence to  treatment, fear of suicide, hopefulness/future orientation, moral objections to suicide, positive family relationships, and sense of responsibility toward family  Acute Risk of Harm to Self is Considered: low    Relevant Results  Results for orders placed or performed during the hospital encounter of 02/10/25 (from the past 96 hours)   CBC and Auto Differential   Result Value Ref Range    WBC 7.3 4.4 - 11.3 x10*3/uL    nRBC 0.0 0.0 - 0.0 /100 WBCs    RBC 3.51 (L) 4.00 - 5.20 x10*6/uL    Hemoglobin 11.6 (L) 12.0 - 16.0 g/dL    Hematocrit 35.2 (L) 36.0 - 46.0 %     80 - 100 fL    MCH 33.0 26.0 - 34.0 pg    MCHC 33.0 32.0 - 36.0 g/dL    RDW 13.1 11.5 - 14.5 %    Platelets 214 150 - 450 x10*3/uL    Neutrophils % 78.5 40.0 - 80.0 %    Immature Granulocytes %, Automated 0.3 0.0 - 0.9 %    Lymphocytes % 12.7 13.0 - 44.0 %    Monocytes % 6.3 2.0 - 10.0 %    Eosinophils % 1.4 0.0 - 6.0 %    Basophils % 0.8 0.0 - 2.0 %    Neutrophils Absolute 5.74 (H) 1.60 - 5.50 x10*3/uL    Immature Granulocytes Absolute, Automated 0.02 0.00 - 0.50 x10*3/uL    Lymphocytes Absolute 0.93 0.80 - 3.00 x10*3/uL    Monocytes Absolute 0.46 0.05 - 0.80 x10*3/uL    Eosinophils Absolute 0.10 0.00 - 0.40 x10*3/uL    Basophils Absolute 0.06 0.00 - 0.10 x10*3/uL   Comprehensive metabolic panel   Result Value Ref Range    Glucose 127 (H) 74 - 99  mg/dL    Sodium 142 136 - 145 mmol/L    Potassium 3.8 3.5 - 5.3 mmol/L    Chloride 106 98 - 107 mmol/L    Bicarbonate 29 21 - 32 mmol/L    Anion Gap 11 10 - 20 mmol/L    Urea Nitrogen 19 6 - 23 mg/dL    Creatinine 0.94 0.50 - 1.05 mg/dL    eGFR 61 >60 mL/min/1.73m*2    Calcium 9.2 8.6 - 10.3 mg/dL    Albumin 4.0 3.4 - 5.0 g/dL    Alkaline Phosphatase 89 33 - 136 U/L    Total Protein 6.4 6.4 - 8.2 g/dL    AST 18 9 - 39 U/L    Bilirubin, Total 0.4 0.0 - 1.2 mg/dL    ALT 8 7 - 45 U/L   Protime-INR   Result Value Ref Range    Protime 11.4 9.3 - 12.7 seconds    INR 1.1 0.9 - 1.2   APTT   Result Value Ref Range    aPTT 22.9 22.0 - 32.5 seconds   Creatine Kinase   Result Value Ref Range    Creatine Kinase 92 0 - 215 U/L   CBC and Auto Differential   Result Value Ref Range    WBC 6.4 4.4 - 11.3 x10*3/uL    nRBC 0.0 0.0 - 0.0 /100 WBCs    RBC 3.02 (L) 4.00 - 5.20 x10*6/uL    Hemoglobin 10.0 (L) 12.0 - 16.0 g/dL    Hematocrit 29.9 (L) 36.0 - 46.0 %    MCV 99 80 - 100 fL    MCH 33.1 26.0 - 34.0 pg    MCHC 33.4 32.0 - 36.0 g/dL    RDW 13.1 11.5 - 14.5 %    Platelets 162 150 - 450 x10*3/uL    Neutrophils % 73.9 40.0 - 80.0 %    Immature Granulocytes %, Automated 0.3 0.0 - 0.9 %    Lymphocytes % 14.1 13.0 - 44.0 %    Monocytes % 10.2 2.0 - 10.0 %    Eosinophils % 0.9 0.0 - 6.0 %    Basophils % 0.6 0.0 - 2.0 %    Neutrophils Absolute 4.73 1.60 - 5.50 x10*3/uL    Immature Granulocytes Absolute, Automated 0.02 0.00 - 0.50 x10*3/uL    Lymphocytes Absolute 0.90 0.80 - 3.00 x10*3/uL    Monocytes Absolute 0.65 0.05 - 0.80 x10*3/uL    Eosinophils Absolute 0.06 0.00 - 0.40 x10*3/uL    Basophils Absolute 0.04 0.00 - 0.10 x10*3/uL   Renal Function Panel   Result Value Ref Range    Glucose 116 (H) 74 - 99 mg/dL    Sodium 143 136 - 145 mmol/L    Potassium 3.5 3.5 - 5.3 mmol/L    Chloride 110 (H) 98 - 107 mmol/L    Bicarbonate 25 21 - 32 mmol/L    Anion Gap 12 10 - 20 mmol/L    Urea Nitrogen 18 6 - 23 mg/dL    Creatinine 0.89 0.50 - 1.05 mg/dL     eGFR 65 >60 mL/min/1.73m*2    Calcium 8.5 (L) 8.6 - 10.3 mg/dL    Phosphorus 3.3 2.5 - 4.9 mg/dL    Albumin 3.2 (L) 3.4 - 5.0 g/dL   CBC and Auto Differential   Result Value Ref Range    WBC 7.5 4.4 - 11.3 x10*3/uL    nRBC 0.0 0.0 - 0.0 /100 WBCs    RBC 3.08 (L) 4.00 - 5.20 x10*6/uL    Hemoglobin 10.0 (L) 12.0 - 16.0 g/dL    Hematocrit 31.6 (L) 36.0 - 46.0 %     (H) 80 - 100 fL    MCH 32.5 26.0 - 34.0 pg    MCHC 31.6 (L) 32.0 - 36.0 g/dL    RDW 13.1 11.5 - 14.5 %    Platelets 159 150 - 450 x10*3/uL    Neutrophils % 81.4 40.0 - 80.0 %    Immature Granulocytes %, Automated 0.3 0.0 - 0.9 %    Lymphocytes % 7.8 13.0 - 44.0 %    Monocytes % 9.8 2.0 - 10.0 %    Eosinophils % 0.3 0.0 - 6.0 %    Basophils % 0.4 0.0 - 2.0 %    Neutrophils Absolute 6.07 (H) 1.60 - 5.50 x10*3/uL    Immature Granulocytes Absolute, Automated 0.02 0.00 - 0.50 x10*3/uL    Lymphocytes Absolute 0.58 (L) 0.80 - 3.00 x10*3/uL    Monocytes Absolute 0.73 0.05 - 0.80 x10*3/uL    Eosinophils Absolute 0.02 0.00 - 0.40 x10*3/uL    Basophils Absolute 0.03 0.00 - 0.10 x10*3/uL   Renal Function Panel   Result Value Ref Range    Glucose 138 (H) 74 - 99 mg/dL    Sodium 140 136 - 145 mmol/L    Potassium 3.9 3.5 - 5.3 mmol/L    Chloride 108 (H) 98 - 107 mmol/L    Bicarbonate 26 21 - 32 mmol/L    Anion Gap 10 10 - 20 mmol/L    Urea Nitrogen 22 6 - 23 mg/dL    Creatinine 1.12 (H) 0.50 - 1.05 mg/dL    eGFR 49 (L) >60 mL/min/1.73m*2    Calcium 7.9 (L) 8.6 - 10.3 mg/dL    Phosphorus 3.8 2.5 - 4.9 mg/dL    Albumin 2.8 (L) 3.4 - 5.0 g/dL   CBC and Auto Differential   Result Value Ref Range    WBC 7.7 4.4 - 11.3 x10*3/uL    nRBC 0.0 0.0 - 0.0 /100 WBCs    RBC 2.85 (L) 4.00 - 5.20 x10*6/uL    Hemoglobin 9.2 (L) 12.0 - 16.0 g/dL    Hematocrit 28.2 (L) 36.0 - 46.0 %    MCV 99 80 - 100 fL    MCH 32.3 26.0 - 34.0 pg    MCHC 32.6 32.0 - 36.0 g/dL    RDW 13.1 11.5 - 14.5 %    Platelets 162 150 - 450 x10*3/uL    Neutrophils % 85.0 40.0 - 80.0 %    Immature Granulocytes  %, Automated 0.4 0.0 - 0.9 %    Lymphocytes % 6.5 13.0 - 44.0 %    Monocytes % 8.0 2.0 - 10.0 %    Eosinophils % 0.0 0.0 - 6.0 %    Basophils % 0.1 0.0 - 2.0 %    Neutrophils Absolute 6.56 (H) 1.60 - 5.50 x10*3/uL    Immature Granulocytes Absolute, Automated 0.03 0.00 - 0.50 x10*3/uL    Lymphocytes Absolute 0.50 (L) 0.80 - 3.00 x10*3/uL    Monocytes Absolute 0.62 0.05 - 0.80 x10*3/uL    Eosinophils Absolute 0.00 0.00 - 0.40 x10*3/uL    Basophils Absolute 0.01 0.00 - 0.10 x10*3/uL   Renal Function Panel   Result Value Ref Range    Glucose 102 (H) 74 - 99 mg/dL    Sodium 140 136 - 145 mmol/L    Potassium 3.6 3.5 - 5.3 mmol/L    Chloride 107 98 - 107 mmol/L    Bicarbonate 26 21 - 32 mmol/L    Anion Gap 11 10 - 20 mmol/L    Urea Nitrogen 29 (H) 6 - 23 mg/dL    Creatinine 1.19 (H) 0.50 - 1.05 mg/dL    eGFR 46 (L) >60 mL/min/1.73m*2    Calcium 8.5 (L) 8.6 - 10.3 mg/dL    Phosphorus 3.7 2.5 - 4.9 mg/dL    Albumin 2.8 (L) 3.4 - 5.0 g/dL   CBC and Auto Differential   Result Value Ref Range    WBC 7.5 4.4 - 11.3 x10*3/uL    nRBC 0.0 0.0 - 0.0 /100 WBCs    RBC 2.77 (L) 4.00 - 5.20 x10*6/uL    Hemoglobin 9.2 (L) 12.0 - 16.0 g/dL    Hematocrit 28.1 (L) 36.0 - 46.0 %     (H) 80 - 100 fL    MCH 33.2 26.0 - 34.0 pg    MCHC 32.7 32.0 - 36.0 g/dL    RDW 13.0 11.5 - 14.5 %    Platelets 170 150 - 450 x10*3/uL    Neutrophils % 74.1 40.0 - 80.0 %    Immature Granulocytes %, Automated 0.4 0.0 - 0.9 %    Lymphocytes % 15.8 13.0 - 44.0 %    Monocytes % 8.1 2.0 - 10.0 %    Eosinophils % 1.3 0.0 - 6.0 %    Basophils % 0.3 0.0 - 2.0 %    Neutrophils Absolute 5.56 (H) 1.60 - 5.50 x10*3/uL    Immature Granulocytes Absolute, Automated 0.03 0.00 - 0.50 x10*3/uL    Lymphocytes Absolute 1.19 0.80 - 3.00 x10*3/uL    Monocytes Absolute 0.61 0.05 - 0.80 x10*3/uL    Eosinophils Absolute 0.10 0.00 - 0.40 x10*3/uL    Basophils Absolute 0.02 0.00 - 0.10 x10*3/uL   Pertinent labs were reviewed    Scheduled medications  acetaminophen, 975 mg, oral,  q8h  allopurinol, 300 mg, oral, Daily  aspirin, 325 mg, oral, BID  clotrimazole, , Topical, BID  docusate sodium, 100 mg, oral, BID  gabapentin, 300 mg, oral, Daily  lidocaine, 1 patch, transdermal, q24h  melatonin, 3 mg, oral, Daily  metoprolol succinate XL, 50 mg, oral, Daily  OLANZapine zydis, 5 mg, oral, Nightly  pantoprazole, 40 mg, oral, Daily before breakfast  polyethylene glycol, 17 g, oral, Daily  psyllium, 1 packet, oral, Daily      Continuous medications  oxygen, 2 L/min  potassium chloride in 0.9%NaCl, 50 mL/hr      PRN medications  PRN medications: HYDROcodone-acetaminophen, HYDROmorphone, hydrOXYzine HCL, morphine, morphine, naloxone, OLANZapine **OR** OLANZapine, ondansetron    Assessment/Plan   Assessment & Plan  Closed displaced midcervical fracture of left femur    Fall, initial encounter    Delirium    Anemia    Agitation due to delirium superimposed on underlying cognitive impairment.       Plan:     - Can give olanzapine 2.5 mg PO or 2.5mg IM Q8H PRN for agitation.  - Continue Zyprexa Zydis 5 mg at bedtime to help with behavioral disturbances and impulse control  - Continue melatonin to 3 mg PO at 1800 to aid sleep-wake cycle  - Continue Hydroxyzine 10 mg Q4H PRN for anxiety, restlessness     Would recommend the use of delirium precautions with this patient:   -- Minimize use of benzos, opiates, anticholinergics, as these may worsen mental status   -- Would use caution with narcotic pain medications   -- Would still adequately controlling pain, as uncontrolled pain is also a risk factor for delirium   -- Reinforce sleep hygiene; encourage patient to stay awake during the day   -- Keep curtains/blinds open during the day and closed at night.   -- Would recommend reorienting/redirecting patient as much as possible,    -- Aim for consistent staffing, familiar objects, avoiding bright lights and loud noises, etc.     -Patient currently does not meet the criteria for the inpatient psychiatric  treatment.     -Thank you for allowing us to participate in the care of this patient. Psychiatry will sign off at this time. Please re consult if needed.       I spent 25 minutes in the professional and overall care of this patient.      RONNY Crawford-CNP

## 2025-02-14 NOTE — NURSING NOTE
Bed side shift report received. Patient resting comfortably. Reports no current concerns at this time. Patient alert. Call light within reach. This nurse assumed care. Bed alarm active. Bed in lowest position.  Surgical Dressing to left hip dry, clean and intact with some bruising surrounding area.

## 2025-02-14 NOTE — DOCUMENTATION CLARIFICATION NOTE
"    PATIENT:               CHRISTIE DONNELLY  ACCT #:                  [id number]  MRN:                       [id number]  :                       1940  ADMIT DATE:       2/10/2025 3:30 PM  DISCH DATE:  RESPONDING PROVIDER #:        [id number]          PROVIDER RESPONSE TEXT:    I agree with dietician diagnosis of Severe malnutrition on 2025    CDI QUERY TEXT:    Clarification        Instruction:    Based on your assessment of the patient and the clinical information, please provide the requested documentation by clicking on the appropriate radio button and enter any additional information if prompted.    Question: Please further clarify this patient nutritional status as    When answering this query, please exercise your independent professional judgment. The fact that a question is being asked, does not imply that any particular answer is desired or expected.    The patient's clinical indicators include:  Clinical Information:  84 y.o. female presenting with hip fracture.    Clinical Indicators:    : Nutrition assessment: \"Malnutrition Diagnosis: Severe malnutrition related to chronic disease or condition\"    BMI 17.37  Percent of IBW: 86 %    \"Nutrition Focused Physical Exam Findings:  Subcutaneous Fat Loss  Orbital Fat Pads: Severe (dark circles, hollowing and loose skin)  Buccal Fat Pads: Severe (hollow, sunken and narrow face)  Triceps: Severe (negligible fat tissue)    Muscle Wasting  Temporalis: Severe (hollowed scooping depression)  Pectoralis (Clavicular Region): Severe (protruding prominent clavicle)  Deltoid/Trapezius: Severe (squared shoulders, acromion process prominent)\"    Treatment: Ensure plus high protein TID, RD/ LD assessment,  8004-4719 calories, 52-65 gm protein to be provided via diet/nutritional supplement    Risk Factors: Poor appetite, Tobacco, alcohol use  Options provided:  -- I agree with dietician diagnosis of Severe malnutrition on 2025  -- Other - I will add my own " diagnosis  -- Refer to Clinical Documentation Reviewer    Query created by: Jaylon Summers on 2/14/2025 9:51 AM      Electronically signed by:  CATIE ALFORD 2/14/2025 10:20 AM

## 2025-02-14 NOTE — PROGRESS NOTES
"Cecelia Chávez is a 84 y.o. female on day 4 of admission presenting with Closed displaced midcervical fracture of left femur.    Subjective   Sitting comfortably in chair.       Objective     Physical Exam Left hip dressing intact. NVI. Negative Homans    Last Recorded Vitals  Blood pressure 107/62, pulse 63, temperature 36.7 °C (98.1 °F), temperature source Temporal, resp. rate 18, height 1.575 m (5' 2\"), weight 47 kg (103 lb 9.9 oz), SpO2 100%.  Intake/Output last 3 Shifts:  I/O last 3 completed shifts:  In: 920 (19.6 mL/kg) [P.O.:720; IV Piggyback:200]  Out: 1275 (27.1 mL/kg) [Urine:1275 (0.8 mL/kg/hr)]  Weight: 47 kg     Relevant Results      Scheduled medications  acetaminophen, 975 mg, oral, q8h  allopurinol, 300 mg, oral, Daily  aspirin, 325 mg, oral, BID  clotrimazole, , Topical, BID  docusate sodium, 100 mg, oral, BID  gabapentin, 300 mg, oral, Daily  lidocaine, 1 patch, transdermal, q24h  melatonin, 3 mg, oral, Daily  metoprolol succinate XL, 50 mg, oral, Daily  OLANZapine zydis, 5 mg, oral, Nightly  pantoprazole, 40 mg, oral, Daily before breakfast  polyethylene glycol, 17 g, oral, Daily  psyllium, 1 packet, oral, Daily      Continuous medications  oxygen, 2 L/min      PRN medications  PRN medications: HYDROcodone-acetaminophen, HYDROmorphone, hydrOXYzine HCL, morphine, morphine, naloxone, OLANZapine **OR** OLANZapine, ondansetron  Results for orders placed or performed during the hospital encounter of 02/10/25 (from the past 24 hours)   Renal Function Panel   Result Value Ref Range    Glucose 102 (H) 74 - 99 mg/dL    Sodium 140 136 - 145 mmol/L    Potassium 3.6 3.5 - 5.3 mmol/L    Chloride 107 98 - 107 mmol/L    Bicarbonate 26 21 - 32 mmol/L    Anion Gap 11 10 - 20 mmol/L    Urea Nitrogen 29 (H) 6 - 23 mg/dL    Creatinine 1.19 (H) 0.50 - 1.05 mg/dL    eGFR 46 (L) >60 mL/min/1.73m*2    Calcium 8.5 (L) 8.6 - 10.3 mg/dL    Phosphorus 3.7 2.5 - 4.9 mg/dL    Albumin 2.8 (L) 3.4 - 5.0 g/dL   CBC and Auto " Differential   Result Value Ref Range    WBC 7.5 4.4 - 11.3 x10*3/uL    nRBC 0.0 0.0 - 0.0 /100 WBCs    RBC 2.77 (L) 4.00 - 5.20 x10*6/uL    Hemoglobin 9.2 (L) 12.0 - 16.0 g/dL    Hematocrit 28.1 (L) 36.0 - 46.0 %     (H) 80 - 100 fL    MCH 33.2 26.0 - 34.0 pg    MCHC 32.7 32.0 - 36.0 g/dL    RDW 13.0 11.5 - 14.5 %    Platelets 170 150 - 450 x10*3/uL    Neutrophils % 74.1 40.0 - 80.0 %    Immature Granulocytes %, Automated 0.4 0.0 - 0.9 %    Lymphocytes % 15.8 13.0 - 44.0 %    Monocytes % 8.1 2.0 - 10.0 %    Eosinophils % 1.3 0.0 - 6.0 %    Basophils % 0.3 0.0 - 2.0 %    Neutrophils Absolute 5.56 (H) 1.60 - 5.50 x10*3/uL    Immature Granulocytes Absolute, Automated 0.03 0.00 - 0.50 x10*3/uL    Lymphocytes Absolute 1.19 0.80 - 3.00 x10*3/uL    Monocytes Absolute 0.61 0.05 - 0.80 x10*3/uL    Eosinophils Absolute 0.10 0.00 - 0.40 x10*3/uL    Basophils Absolute 0.02 0.00 - 0.10 x10*3/uL                         Assessment/Plan   Assessment & Plan  Closed displaced midcervical fracture of left femur    Fall, initial encounter    Delirium    Anemia    POD1 recovering appropriately. Cont to mobilize. Ok for discharge when cleared medically.         Clarke Patel MD

## 2025-02-15 LAB
ALBUMIN SERPL BCP-MCNC: 2.5 G/DL (ref 3.4–5)
ANION GAP SERPL CALCULATED.3IONS-SCNC: 9 MMOL/L (ref 10–20)
BASOPHILS # BLD AUTO: 0.04 X10*3/UL (ref 0–0.1)
BASOPHILS NFR BLD AUTO: 0.7 %
BUN SERPL-MCNC: 31 MG/DL (ref 6–23)
CALCIUM SERPL-MCNC: 8.1 MG/DL (ref 8.6–10.3)
CHLORIDE SERPL-SCNC: 112 MMOL/L (ref 98–107)
CO2 SERPL-SCNC: 25 MMOL/L (ref 21–32)
CREAT SERPL-MCNC: 0.94 MG/DL (ref 0.5–1.05)
EGFRCR SERPLBLD CKD-EPI 2021: 61 ML/MIN/1.73M*2
EOSINOPHIL # BLD AUTO: 0.33 X10*3/UL (ref 0–0.4)
EOSINOPHIL NFR BLD AUTO: 5.8 %
ERYTHROCYTE [DISTWIDTH] IN BLOOD BY AUTOMATED COUNT: 13.1 % (ref 11.5–14.5)
GLUCOSE SERPL-MCNC: 85 MG/DL (ref 74–99)
HCT VFR BLD AUTO: 29.7 % (ref 36–46)
HGB BLD-MCNC: 9.4 G/DL (ref 12–16)
IMM GRANULOCYTES # BLD AUTO: 0.02 X10*3/UL (ref 0–0.5)
IMM GRANULOCYTES NFR BLD AUTO: 0.4 % (ref 0–0.9)
LYMPHOCYTES # BLD AUTO: 1.25 X10*3/UL (ref 0.8–3)
LYMPHOCYTES NFR BLD AUTO: 22.1 %
MCH RBC QN AUTO: 32.6 PG (ref 26–34)
MCHC RBC AUTO-ENTMCNC: 31.6 G/DL (ref 32–36)
MCV RBC AUTO: 103 FL (ref 80–100)
MONOCYTES # BLD AUTO: 0.68 X10*3/UL (ref 0.05–0.8)
MONOCYTES NFR BLD AUTO: 12 %
NEUTROPHILS # BLD AUTO: 3.33 X10*3/UL (ref 1.6–5.5)
NEUTROPHILS NFR BLD AUTO: 59 %
NRBC BLD-RTO: 0 /100 WBCS (ref 0–0)
PHOSPHATE SERPL-MCNC: 3.7 MG/DL (ref 2.5–4.9)
PLATELET # BLD AUTO: 173 X10*3/UL (ref 150–450)
POTASSIUM SERPL-SCNC: 4.1 MMOL/L (ref 3.5–5.3)
RBC # BLD AUTO: 2.88 X10*6/UL (ref 4–5.2)
SODIUM SERPL-SCNC: 142 MMOL/L (ref 136–145)
WBC # BLD AUTO: 5.7 X10*3/UL (ref 4.4–11.3)

## 2025-02-15 PROCEDURE — 1100000001 HC PRIVATE ROOM DAILY

## 2025-02-15 PROCEDURE — 2500000005 HC RX 250 GENERAL PHARMACY W/O HCPCS: Performed by: INTERNAL MEDICINE

## 2025-02-15 PROCEDURE — 2500000005 HC RX 250 GENERAL PHARMACY W/O HCPCS

## 2025-02-15 PROCEDURE — 2500000001 HC RX 250 WO HCPCS SELF ADMINISTERED DRUGS (ALT 637 FOR MEDICARE OP): Performed by: INTERNAL MEDICINE

## 2025-02-15 PROCEDURE — 85025 COMPLETE CBC W/AUTO DIFF WBC: CPT | Performed by: INTERNAL MEDICINE

## 2025-02-15 PROCEDURE — 36415 COLL VENOUS BLD VENIPUNCTURE: CPT | Performed by: INTERNAL MEDICINE

## 2025-02-15 PROCEDURE — 2500000004 HC RX 250 GENERAL PHARMACY W/ HCPCS (ALT 636 FOR OP/ED): Mod: JZ | Performed by: INTERNAL MEDICINE

## 2025-02-15 PROCEDURE — 84100 ASSAY OF PHOSPHORUS: CPT | Performed by: INTERNAL MEDICINE

## 2025-02-15 PROCEDURE — 2500000001 HC RX 250 WO HCPCS SELF ADMINISTERED DRUGS (ALT 637 FOR MEDICARE OP)

## 2025-02-15 PROCEDURE — 2500000004 HC RX 250 GENERAL PHARMACY W/ HCPCS (ALT 636 FOR OP/ED): Performed by: INTERNAL MEDICINE

## 2025-02-15 PROCEDURE — 2500000002 HC RX 250 W HCPCS SELF ADMINISTERED DRUGS (ALT 637 FOR MEDICARE OP, ALT 636 FOR OP/ED)

## 2025-02-15 PROCEDURE — 99232 SBSQ HOSP IP/OBS MODERATE 35: CPT

## 2025-02-15 RX ADMIN — ACETAMINOPHEN 975 MG: 325 TABLET, FILM COATED ORAL at 18:44

## 2025-02-15 RX ADMIN — HYDROCODONE BITARTRATE AND ACETAMINOPHEN 1 TABLET: 5; 325 TABLET ORAL at 16:42

## 2025-02-15 RX ADMIN — METOPROLOL SUCCINATE 50 MG: 50 TABLET, EXTENDED RELEASE ORAL at 09:15

## 2025-02-15 RX ADMIN — Medication 3 MG: at 21:04

## 2025-02-15 RX ADMIN — ACETAMINOPHEN 975 MG: 325 TABLET, FILM COATED ORAL at 09:14

## 2025-02-15 RX ADMIN — HYDROXYZINE HYDROCHLORIDE 10 MG: 10 TABLET ORAL at 09:15

## 2025-02-15 RX ADMIN — HYDROMORPHONE HYDROCHLORIDE 0.5 MG: 1 INJECTION, SOLUTION INTRAMUSCULAR; INTRAVENOUS; SUBCUTANEOUS at 02:50

## 2025-02-15 RX ADMIN — LIDOCAINE 1 PATCH: 4 PATCH TOPICAL at 21:04

## 2025-02-15 RX ADMIN — ALLOPURINOL 300 MG: 300 TABLET ORAL at 09:15

## 2025-02-15 RX ADMIN — PANTOPRAZOLE SODIUM 40 MG: 40 TABLET, DELAYED RELEASE ORAL at 09:14

## 2025-02-15 RX ADMIN — POLYETHYLENE GLYCOL 3350 17 G: 17 POWDER, FOR SOLUTION ORAL at 09:15

## 2025-02-15 RX ADMIN — HYDROCODONE BITARTRATE AND ACETAMINOPHEN 1 TABLET: 5; 325 TABLET ORAL at 09:14

## 2025-02-15 RX ADMIN — OLANZAPINE 5 MG: 5 TABLET, ORALLY DISINTEGRATING ORAL at 21:04

## 2025-02-15 RX ADMIN — GABAPENTIN 300 MG: 300 CAPSULE ORAL at 09:15

## 2025-02-15 RX ADMIN — ASPIRIN 325 MG: 325 TABLET, COATED ORAL at 09:15

## 2025-02-15 RX ADMIN — DOCUSATE SODIUM 100 MG: 100 CAPSULE, LIQUID FILLED ORAL at 21:04

## 2025-02-15 RX ADMIN — ASPIRIN 325 MG: 325 TABLET, COATED ORAL at 21:04

## 2025-02-15 RX ADMIN — HYDROXYZINE HYDROCHLORIDE 10 MG: 10 TABLET ORAL at 16:42

## 2025-02-15 RX ADMIN — DOCUSATE SODIUM 100 MG: 100 CAPSULE, LIQUID FILLED ORAL at 09:15

## 2025-02-15 RX ADMIN — CLOTRIMAZOLE: 10 CREAM TOPICAL at 11:00

## 2025-02-15 ASSESSMENT — PAIN SCALES - GENERAL
PAINLEVEL_OUTOF10: 10 - WORST POSSIBLE PAIN
PAINLEVEL_OUTOF10: 4
PAINLEVEL_OUTOF10: 3
PAINLEVEL_OUTOF10: 9

## 2025-02-15 ASSESSMENT — COGNITIVE AND FUNCTIONAL STATUS - GENERAL
DRESSING REGULAR LOWER BODY CLOTHING: A LOT
TURNING FROM BACK TO SIDE WHILE IN FLAT BAD: A LITTLE
DAILY ACTIVITIY SCORE: 18
PERSONAL GROOMING: A LITTLE
CLIMB 3 TO 5 STEPS WITH RAILING: TOTAL
MOBILITY SCORE: 13
WALKING IN HOSPITAL ROOM: TOTAL
DRESSING REGULAR UPPER BODY CLOTHING: A LITTLE
TOILETING: A LITTLE
MOVING TO AND FROM BED TO CHAIR: A LOT
STANDING UP FROM CHAIR USING ARMS: A LOT
HELP NEEDED FOR BATHING: A LITTLE

## 2025-02-15 ASSESSMENT — PAIN SCALES - WONG BAKER
WONGBAKER_NUMERICALRESPONSE: NO HURT
WONGBAKER_NUMERICALRESPONSE: NO HURT

## 2025-02-15 ASSESSMENT — PAIN - FUNCTIONAL ASSESSMENT
PAIN_FUNCTIONAL_ASSESSMENT: 0-10
PAIN_FUNCTIONAL_ASSESSMENT: WONG-BAKER FACES
PAIN_FUNCTIONAL_ASSESSMENT: WONG-BAKER FACES

## 2025-02-15 ASSESSMENT — PAIN DESCRIPTION - DESCRIPTORS: DESCRIPTORS: ACHING;DISCOMFORT

## 2025-02-15 ASSESSMENT — PAIN DESCRIPTION - LOCATION: LOCATION: ABDOMEN

## 2025-02-15 NOTE — NURSING NOTE
End of shift, bedside shift report given. Patient laying in bed resting comfortably, appears to be asleep. Breathing even and unlabored. Call light within reach. IV fluids infusing. Bed alarm active. Bed at lowest position. Dressing to left thigh dry clean and intact

## 2025-02-15 NOTE — PROGRESS NOTES
"Physical Therapy                 Therapy Communication Note    Patient Name: Cecelia Chávez  MRN: [id number]  Department: [address]  Room: [address] [place] [address]  Today's Date: 2/15/2025     Discipline: Physical Therapy    PT Missed Visit: Yes     Missed Visit Reason: Patient refused   (Education and encouragement provided however pt continued to adamantly decline stating \"it's only my second day\" and \"I'm not up for therapy\".)    Missed Time: Attempt    Comment:  "

## 2025-02-15 NOTE — PROGRESS NOTES
Cecelia Chávez is a 84 y.o. female on day 5 of admission presenting with Closed displaced midcervical fracture of left femur.      Subjective   Laying in bed resting quietly. Denies acute overnight events. Appears with improved mentation this AM. Denies pain or complaints at the moment         Objective     Last Recorded Vitals  /59 (BP Location: Left arm, Patient Position: Lying)   Pulse 60   Temp 36.7 °C (98.1 °F) (Oral)   Resp 18   Wt 49.5 kg (109 lb 2 oz)   SpO2 95%   Intake/Output last 3 Shifts:    Intake/Output Summary (Last 24 hours) at 2/15/2025 0935  Last data filed at 2/15/2025 0415  Gross per 24 hour   Intake 772.5 ml   Output 590 ml   Net 182.5 ml       Admission Weight  Weight: (!) 43.1 kg (95 lb) (02/10/25 1533)    Daily Weight  02/15/25 : 49.5 kg (109 lb 2 oz)    Image Results  XR pelvis 1-2 views  Narrative: Interpreted By:  Camryn Huitron,   STUDY:  XR PELVIS 1-2 VIEWS 2/12/2025 3:55 pm      INDICATION:  Signs/Symptoms:Post op hip      COMPARISON:  02/10/2025      ACCESSION NUMBER(S):  [id number]      ORDERING CLINICIAN:  EKTA AMBROCIO      TECHNIQUE:  AP view of the pelvis      FINDINGS:  There is postoperative change from left hip arthroplasty with  satisfactory positioning of the prosthesis. No acute bony abnormality  is seen.      Impression: Satisfactory postoperative appearance following left hip  hemiarthroplasty.      Signed by: Camryn Huitron 2/12/2025 4:02 PM  Dictation workstation:   [id number]      Physical Exam  Vitals and nursing note reviewed.   Constitutional:       Appearance: Normal appearance.      Comments: Frail   HENT:      Head: Normocephalic and atraumatic.      Right Ear: External ear normal.      Left Ear: External ear normal.      Nose: Nose normal.      Mouth/Throat:      Mouth: Mucous membranes are moist.      Comments: Poor dentition   Eyes:      Extraocular Movements: Extraocular movements intact.      Conjunctiva/sclera: Conjunctivae normal.   Cardiovascular:       Rate and Rhythm: Normal rate.      Pulses: Normal pulses.      Heart sounds: Normal heart sounds.   Pulmonary:      Effort: Pulmonary effort is normal.      Breath sounds: Normal breath sounds.   Abdominal:      General: Bowel sounds are normal.      Palpations: Abdomen is soft.   Musculoskeletal:         General: Normal range of motion.      Cervical back: Normal range of motion and neck supple.      Comments: ROM LLE slightly limited due to pain   Otherwise normal ROM    Skin:     General: Skin is warm and dry.      Capillary Refill: Capillary refill takes less than 2 seconds.      Comments: Left hip dressing clean dry intact   Neurological:      Mental Status: She is alert and oriented to person, place, and time.      Comments: Improved mentation from yesterday    Psychiatric:         Mood and Affect: Mood normal.         Behavior: Behavior normal.         Relevant Results               Assessment/Plan        Assessment & Plan  Closed displaced midcervical fracture of left femur  S/p left hip hemiarthroplasty  Pain management   Anticoagulation per orthopedics.  Delirium  Resolved  Continue melatonin   Consider olanzapine if further issues.  Psychiatry Consult  Fall, initial encounter  PT OT   Falls precautions   Anemia  Low stable  Daily CBC    Elevated Cr  Mildly elevated from admission  Gentle IVF            2/13/25: Denies pain or shortness of breath. Does appear to still be with some mild delirium from my standpoint. Will appreciate psychiatry eval.  Patient also complaints of mild, chronic dysphagia over the last 6-12 months. It is worth adding here that I did witness patient swallow cake from her lunch tray without issue. Regardless in the context of this admission in addition to low BMI (17) will obtain Dietician consult to assess overall nutrition status as well as SLP eval to investigate dysphagia. Modified diet in the interim. Add aspiration precautions.     2/14/25: Appreciate improved mentation  today. Denies pain or complaints. Noted SLP eval yesterday; recommending MBSS to assess swallowing. Also noted mildly elevated renal function will start gentle IVF. SNF placement pending.     2/15/25: Denies pain or complaints. Chart reviewed; patient has been refusing MBSS. Appreciate improved renal function will DC IVF. Delirium appears to have resolved patient is alert and oriented x3. Appreciate psychiatry eval. SNF placement pending.          Nina Saenz, APRN-CNP

## 2025-02-15 NOTE — PROGRESS NOTES
"Occupational Therapy                 Therapy Communication Note    Patient Name: Cecelia Chávez  MRN: [id number]  Department: [address]  Room: [facility]-  Today's Date: 2/15/2025     Discipline: Occupational Therapy    OT Missed Visit: Yes     Missed Visit Reason: Missed Visit Reason: Patient refused (Education and encouragement provided however pt continued to adamantly decline stating \"it's only my second day\" and \"I'm not up for therapy\".)    Missed Time: Attempt at 1346    Comment:  "

## 2025-02-16 VITALS
RESPIRATION RATE: 18 BRPM | DIASTOLIC BLOOD PRESSURE: 50 MMHG | WEIGHT: 109.13 LBS | OXYGEN SATURATION: 99 % | SYSTOLIC BLOOD PRESSURE: 110 MMHG | BODY MASS INDEX: 20.08 KG/M2 | TEMPERATURE: 98.4 F | HEIGHT: 62 IN | HEART RATE: 72 BPM

## 2025-02-16 LAB
ALBUMIN SERPL BCP-MCNC: 2.5 G/DL (ref 3.4–5)
ANION GAP SERPL CALCULATED.3IONS-SCNC: 9 MMOL/L (ref 10–20)
BASOPHILS # BLD AUTO: 0.05 X10*3/UL (ref 0–0.1)
BASOPHILS NFR BLD AUTO: 1 %
BUN SERPL-MCNC: 29 MG/DL (ref 6–23)
CALCIUM SERPL-MCNC: 8.5 MG/DL (ref 8.6–10.3)
CHLORIDE SERPL-SCNC: 109 MMOL/L (ref 98–107)
CO2 SERPL-SCNC: 27 MMOL/L (ref 21–32)
CREAT SERPL-MCNC: 0.91 MG/DL (ref 0.5–1.05)
EGFRCR SERPLBLD CKD-EPI 2021: 63 ML/MIN/1.73M*2
EOSINOPHIL # BLD AUTO: 0.3 X10*3/UL (ref 0–0.4)
EOSINOPHIL NFR BLD AUTO: 6 %
ERYTHROCYTE [DISTWIDTH] IN BLOOD BY AUTOMATED COUNT: 13.1 % (ref 11.5–14.5)
GLUCOSE SERPL-MCNC: 105 MG/DL (ref 74–99)
HCT VFR BLD AUTO: 30.4 % (ref 36–46)
HGB BLD-MCNC: 9.5 G/DL (ref 12–16)
IMM GRANULOCYTES # BLD AUTO: 0.01 X10*3/UL (ref 0–0.5)
IMM GRANULOCYTES NFR BLD AUTO: 0.2 % (ref 0–0.9)
LYMPHOCYTES # BLD AUTO: 1.33 X10*3/UL (ref 0.8–3)
LYMPHOCYTES NFR BLD AUTO: 26.6 %
MCH RBC QN AUTO: 32.1 PG (ref 26–34)
MCHC RBC AUTO-ENTMCNC: 31.3 G/DL (ref 32–36)
MCV RBC AUTO: 103 FL (ref 80–100)
MONOCYTES # BLD AUTO: 0.46 X10*3/UL (ref 0.05–0.8)
MONOCYTES NFR BLD AUTO: 9.2 %
NEUTROPHILS # BLD AUTO: 2.85 X10*3/UL (ref 1.6–5.5)
NEUTROPHILS NFR BLD AUTO: 57 %
NRBC BLD-RTO: 0 /100 WBCS (ref 0–0)
PHOSPHATE SERPL-MCNC: 4 MG/DL (ref 2.5–4.9)
PLATELET # BLD AUTO: 205 X10*3/UL (ref 150–450)
POTASSIUM SERPL-SCNC: 4 MMOL/L (ref 3.5–5.3)
RBC # BLD AUTO: 2.96 X10*6/UL (ref 4–5.2)
SODIUM SERPL-SCNC: 141 MMOL/L (ref 136–145)
WBC # BLD AUTO: 5 X10*3/UL (ref 4.4–11.3)

## 2025-02-16 PROCEDURE — 85025 COMPLETE CBC W/AUTO DIFF WBC: CPT | Performed by: INTERNAL MEDICINE

## 2025-02-16 PROCEDURE — 99232 SBSQ HOSP IP/OBS MODERATE 35: CPT

## 2025-02-16 PROCEDURE — 1100000001 HC PRIVATE ROOM DAILY

## 2025-02-16 PROCEDURE — 2500000005 HC RX 250 GENERAL PHARMACY W/O HCPCS

## 2025-02-16 PROCEDURE — 2500000001 HC RX 250 WO HCPCS SELF ADMINISTERED DRUGS (ALT 637 FOR MEDICARE OP): Performed by: INTERNAL MEDICINE

## 2025-02-16 PROCEDURE — 2500000001 HC RX 250 WO HCPCS SELF ADMINISTERED DRUGS (ALT 637 FOR MEDICARE OP)

## 2025-02-16 PROCEDURE — 2500000005 HC RX 250 GENERAL PHARMACY W/O HCPCS: Performed by: INTERNAL MEDICINE

## 2025-02-16 PROCEDURE — 97530 THERAPEUTIC ACTIVITIES: CPT | Mod: GP,CQ

## 2025-02-16 PROCEDURE — 2500000004 HC RX 250 GENERAL PHARMACY W/ HCPCS (ALT 636 FOR OP/ED): Performed by: INTERNAL MEDICINE

## 2025-02-16 PROCEDURE — 36415 COLL VENOUS BLD VENIPUNCTURE: CPT | Performed by: INTERNAL MEDICINE

## 2025-02-16 PROCEDURE — 84100 ASSAY OF PHOSPHORUS: CPT | Performed by: INTERNAL MEDICINE

## 2025-02-16 RX ADMIN — ACETAMINOPHEN 975 MG: 325 TABLET, FILM COATED ORAL at 18:13

## 2025-02-16 RX ADMIN — ALLOPURINOL 300 MG: 300 TABLET ORAL at 09:24

## 2025-02-16 RX ADMIN — HYDROCODONE BITARTRATE AND ACETAMINOPHEN 1 TABLET: 5; 325 TABLET ORAL at 15:17

## 2025-02-16 RX ADMIN — POLYETHYLENE GLYCOL 3350 17 G: 17 POWDER, FOR SOLUTION ORAL at 09:23

## 2025-02-16 RX ADMIN — HYDROCODONE BITARTRATE AND ACETAMINOPHEN 1 TABLET: 5; 325 TABLET ORAL at 22:01

## 2025-02-16 RX ADMIN — PSYLLIUM HUSK 1 PACKET: 3.4 POWDER ORAL at 09:24

## 2025-02-16 RX ADMIN — ACETAMINOPHEN 975 MG: 325 TABLET, FILM COATED ORAL at 09:23

## 2025-02-16 RX ADMIN — LIDOCAINE 1 PATCH: 4 PATCH TOPICAL at 20:24

## 2025-02-16 RX ADMIN — HYDROCODONE BITARTRATE AND ACETAMINOPHEN 1 TABLET: 5; 325 TABLET ORAL at 09:37

## 2025-02-16 RX ADMIN — HYDROCODONE BITARTRATE AND ACETAMINOPHEN 1 TABLET: 5; 325 TABLET ORAL at 03:54

## 2025-02-16 RX ADMIN — DOCUSATE SODIUM 100 MG: 100 CAPSULE, LIQUID FILLED ORAL at 09:24

## 2025-02-16 RX ADMIN — ASPIRIN 325 MG: 325 TABLET, COATED ORAL at 20:23

## 2025-02-16 RX ADMIN — PANTOPRAZOLE SODIUM 40 MG: 40 TABLET, DELAYED RELEASE ORAL at 05:51

## 2025-02-16 RX ADMIN — CLOTRIMAZOLE: 10 CREAM TOPICAL at 20:23

## 2025-02-16 RX ADMIN — ASPIRIN 325 MG: 325 TABLET, COATED ORAL at 09:24

## 2025-02-16 RX ADMIN — HYDROXYZINE HYDROCHLORIDE 10 MG: 10 TABLET ORAL at 15:17

## 2025-02-16 RX ADMIN — DOCUSATE SODIUM 100 MG: 100 CAPSULE, LIQUID FILLED ORAL at 20:23

## 2025-02-16 RX ADMIN — GABAPENTIN 300 MG: 300 CAPSULE ORAL at 09:24

## 2025-02-16 RX ADMIN — Medication 3 MG: at 20:23

## 2025-02-16 RX ADMIN — CLOTRIMAZOLE: 10 CREAM TOPICAL at 09:24

## 2025-02-16 ASSESSMENT — PAIN SCALES - GENERAL
PAINLEVEL_OUTOF10: 3
PAINLEVEL_OUTOF10: 9
PAINLEVEL_OUTOF10: 6
PAINLEVEL_OUTOF10: 4
PAINLEVEL_OUTOF10: 5 - MODERATE PAIN

## 2025-02-16 ASSESSMENT — COGNITIVE AND FUNCTIONAL STATUS - GENERAL
MOVING FROM LYING ON BACK TO SITTING ON SIDE OF FLAT BED WITH BEDRAILS: A LOT
HELP NEEDED FOR BATHING: A LOT
DRESSING REGULAR UPPER BODY CLOTHING: A LOT
DRESSING REGULAR UPPER BODY CLOTHING: A LOT
WALKING IN HOSPITAL ROOM: A LOT
CLIMB 3 TO 5 STEPS WITH RAILING: TOTAL
PERSONAL GROOMING: A LOT
MOBILITY SCORE: 14
STANDING UP FROM CHAIR USING ARMS: A LOT
HELP NEEDED FOR BATHING: A LOT
WALKING IN HOSPITAL ROOM: A LOT
EATING MEALS: A LOT
STANDING UP FROM CHAIR USING ARMS: A LOT
TURNING FROM BACK TO SIDE WHILE IN FLAT BAD: A LITTLE
MOVING TO AND FROM BED TO CHAIR: A LOT
TOILETING: A LOT
DAILY ACTIVITIY SCORE: 12
TURNING FROM BACK TO SIDE WHILE IN FLAT BAD: A LITTLE
DRESSING REGULAR LOWER BODY CLOTHING: A LOT
MOBILITY SCORE: 10
WALKING IN HOSPITAL ROOM: A LOT
DRESSING REGULAR LOWER BODY CLOTHING: A LOT
MOVING TO AND FROM BED TO CHAIR: A LOT
TOILETING: A LOT
CLIMB 3 TO 5 STEPS WITH RAILING: TOTAL
STANDING UP FROM CHAIR USING ARMS: A LOT
MOVING TO AND FROM BED TO CHAIR: A LOT
CLIMB 3 TO 5 STEPS WITH RAILING: A LOT
MOBILITY SCORE: 15
TURNING FROM BACK TO SIDE WHILE IN FLAT BAD: TOTAL
EATING MEALS: A LITTLE
DAILY ACTIVITIY SCORE: 13
PERSONAL GROOMING: A LOT

## 2025-02-16 ASSESSMENT — PAIN - FUNCTIONAL ASSESSMENT
PAIN_FUNCTIONAL_ASSESSMENT: FLACC (FACE, LEGS, ACTIVITY, CRY, CONSOLABILITY)
PAIN_FUNCTIONAL_ASSESSMENT: 0-10
PAIN_FUNCTIONAL_ASSESSMENT: FLACC (FACE, LEGS, ACTIVITY, CRY, CONSOLABILITY)

## 2025-02-16 ASSESSMENT — PAIN DESCRIPTION - ORIENTATION
ORIENTATION: LEFT
ORIENTATION: LEFT

## 2025-02-16 ASSESSMENT — PAIN DESCRIPTION - DESCRIPTORS
DESCRIPTORS: OTHER (COMMENT)
DESCRIPTORS: ACHING;CRAMPING
DESCRIPTORS: ACHING;DISCOMFORT

## 2025-02-16 ASSESSMENT — PAIN DESCRIPTION - LOCATION
LOCATION: HIP
LOCATION: HIP

## 2025-02-16 NOTE — PROGRESS NOTES
Physical Therapy    Physical Therapy Treatment    Patient Name: Cecelia Chávez  MRN: [id number]  Department: [address]  Room: [address] [place] [address]-  Today's Date: 2/16/2025  Time Calculation  Start Time: 1405  Stop Time: 1435  Time Calculation (min): 30 min    Assessment/Plan   PT Assessment  PT Assessment Results: Decreased strength, Decreased range of motion, Decreased endurance, Impaired balance, Decreased mobility, Decreased coordination, Decreased safety awareness, Orthopedic restrictions, Pain  Rehab Prognosis: Good  Barriers to Discharge Home: Caregiver assistance, Physical needs  Caregiver Assistance: Caregiver assistance needed per identified barriers - however, level of patient's required assistance exceeds assistance available at home  Physical Needs: Stair navigation into home limited by function/safety, Ambulating household distances limited by function/safety, High falls risk due to function or environment  Evaluation/Treatment Tolerance: Patient limited by fatigue, Patient limited by pain, Other (Comment) (Apprehension.)  Medical Staff Made Aware: Yes  Strengths: Ability to acquire knowledge  Barriers to Participation: Comorbidities  End of Session Communication: Bedside nurse  Assessment Comment: Pt anxious and fearful of falling during transfer training. Pt continues to present with significant strength and mobility impairements requiring max assist for all functional mobility.  End of Session Patient Position: Up in chair, Alarm on  PT Plan  Inpatient/Swing Bed or Outpatient: Inpatient  PT Plan  Treatment/Interventions: Bed mobility, Transfer training, Gait training, Stair training, Balance training, Strengthening, Endurance training, Range of motion, Therapeutic exercise, Therapeutic activity, Positioning, Postural re-education  PT Plan: Ongoing PT  PT Frequency: Daily  PT Discharge Recommendations: Moderate intensity level of continued care  Equipment Recommended upon Discharge: Wheeled walker  PT Recommended  Transfer Status: Assist x1, Assist x2, Assistive device  PT - OK to Discharge: Yes    General Visit Information:   PT  Visit  PT Received On: 02/16/25  Response to Previous Treatment: Patient with no complaints from previous session.  General  Reason for Referral: Pt. is an 84 year old female admit with midcervical fracture of left femur. Pt. has a fall at home and was down for ~ 1 hour before someone found her and helped her up. Pt. did hit her head during fall. Pt. had a hip hemiarthroplastly by Dr. Patel.  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  Family/Caregiver Present: No  Prior to Session Communication: Bedside nurse  Patient Position Received: Bed, 3 rail up, Alarm on  Preferred Learning Style: verbal, visual  General Comment: Cleared by RN, NAD, agreeable to PT    Subjective   Precautions:  Precautions  Hearing/Visual Limitations: mild [place]  LE Weight Bearing Status: Weight Bearing as Tolerated  Medical Precautions: Fall precautions  Post-Surgical Precautions: Left hip precautions  Precautions Comment: educated on KEZIA precautions    Objective   Pain:  Pain Assessment  Pain Assessment: 0-10  0-10 (Numeric) Pain Score: 3  Pain Type: Surgical pain  Pain Location: Hip  Pain Orientation: Left  Pain Descriptors: Aching, Cramping  Pain Frequency: Constant/continuous  Patient's Stated Pain Goal: No pain  Pain Interventions: Ambulation/increased activity, Relaxation technique  Response to Interventions: Resting quietly  Cognition:  Cognition  Overall Cognitive Status: Within Functional Limits  Orientation Level: Disoriented to time  Following Commands: Follows one step commands with increased time  Safety Judgment: Decreased awareness of need for safety precautions  Insight: Moderate  Impulsive: Mildly  Processing Speed: Delayed  Coordination:  Movements are Fluid and  Coordinated: No  Coordination Comment: decreased rate and accuracy of movement  Postural Control:  Postural Control  Postural Control: Impaired  Posture Comment: fwd head, rounded shoulders  Static Sitting Balance  Static Sitting-Balance Support: Bilateral upper extremity supported, Feet supported  Static Sitting-Level of Assistance: Close supervision  Static Sitting-Comment/Number of Minutes: Fair at EOB; Good in BSC  Dynamic Sitting Balance  Dynamic Sitting-Balance Support: Bilateral upper extremity supported, Feet supported  Dynamic Sitting-Level of Assistance: Contact guard  Dynamic Sitting-Comments: Fair+ EOB  Static Standing Balance  Static Standing-Balance Support: Bilateral upper extremity supported  Static Standing-Level of Assistance: Maximum assistance  Static Standing-Comment/Number of Minutes: + Gait Belt. Increased time for weight shifting and erect posture with encouragement.  Dynamic Standing Balance  Dynamic Standing-Balance Support: Bilateral upper extremity supported  Dynamic Standing-Level of Assistance: Maximum assistance  Dynamic Standing-Balance: Turning  Dynamic Standing-Comments: +Gait belt and RW. Fair-    Activity Tolerance:  Activity Tolerance  Endurance: Decreased tolerance for upright activites  Activity Tolerance Comments: limited by pain and fear.  Rate of Perceived Exertion (RPE): 6/10  Treatments:  Therapeutic Exercise  Therapeutic Exercise Performed: Yes  Therapeutic Exercise Activity 1: B ankle pumps x10  Therapeutic Exercise Activity 2: Bilat SAQ x10  Therapeutic Exercise Activity 3: Bilat glut sets x10    Therapeutic Activity  Therapeutic Activity Performed: Yes  Therapeutic Activity 1: Functional transfers, mobility, education, relaxation technique, seated and standing activity tolerance.    Bed Mobility  Bed Mobility: Yes  Bed Mobility 1  Bed Mobility 1: Supine to sitting, Scooting  Level of Assistance 1: Maximum assistance  Bed Mobility Comments 1: HOB elevated, side rail and  draw sheet for assist as needed. HHA to complete trunk up. Assist to advance LLE of EOB.    Ambulation/Gait Training  Ambulation/Gait Training Performed: Yes  Ambulation/Gait Training 1  Surface 1: Level tile  Device 1: Rolling walker  Gait Support Devices: Gait belt  Assistance 1: Maximum assistance  Quality of Gait 1: Narrow base of support, Diminished heel strike, Decreased step length, Soft knee(s)  Comments/Distance (ft) 1: ~5-6 small turning and retro steps to chair.  Transfers  Transfer: Yes  Transfer 1  Transfer From 1: Bed to, Stand to  Transfer to 1: Stand, Bed  Technique 1: Sit to stand, Stand to sit  Transfer Device 1: Walker, Gait belt  Transfer Level of Assistance 1: Moderate assistance, +2, Minimal verbal cues  Trials/Comments 1: x2 trials with increased time for weight shifting and erect posture.  Transfers 2  Transfer From 2: Bed to  Transfer to 2: Chair with arms  Technique 2: Sit to stand, Stand to sit, To left  Transfer Device 2: Walker, Gait belt  Transfer Level of Assistance 2: Maximum assistance, Maximum verbal cues, Maximum tactile cues  Trials/Comments 2: Max A to steady and manage AD. Assist for hand placement once at chair and eccentric control.    Other Activity  Other Activity Performed: Yes  Other Activity 1: Therapeutic breathing as needed for safety and comfort.    Outcome Measures:  [facility] Basic Mobility  Turning from your back to your side while in a flat bed without using bedrails: A lot  Moving from lying on your back to sitting on the side of a flat bed without using bedrails: Total  Moving to and from bed to chair (including a wheelchair): A lot  Standing up from a chair using your arms (e.g. wheelchair or bedside chair): A lot  To walk in hospital room: A lot  Climbing 3-5 steps with railing: Total  Basic Mobility - Total Score: 10    IP EDUCATION:  Inpatient Education  Individual(s) Educated: Patient  Education Provided: Body Mechanics, Fall Risk, Post-Op Precautions,  Posture  Patient Response to Education: Patient/Caregiver Verbalized Understanding of Information  Education Comment: Needs reinforcement    Encounter Problems       Encounter Problems (Active)       Mobility       STG - Patient will ambulate 30' with use of RW and min A, maintaining L LE hip precautions, WBAT (Progressing)       Start:  02/13/25    Expected End:  02/27/25            STG - Patient will ascend and descend 3 steps with pascale handrails, step-to pattern, WBAT, maintaining L LE hip precautions  (Not Progressing)       Start:  02/13/25    Expected End:  02/27/25               PT Transfers       STG - Patient will perform bed mobility with min A  (Progressing)       Start:  02/13/25    Expected End:  02/27/25            STG - Patient will transfer sit to and from stand with min A, maintaining L LE hip precautions, WBAT  (Progressing)       Start:  02/13/25    Expected End:  02/27/25               Pain - Adult          Safety       LTG - Patient will adhere to hip precautions during ADL's and transfers (Progressing)       Start:  02/13/25    Expected End:  02/27/25

## 2025-02-16 NOTE — PROGRESS NOTES
Cecelia Chávez is a 84 y.o. female on day 6 of admission presenting with Closed displaced midcervical fracture of left femur.      Subjective   Laying in bed resting quietly. Denies acute overnight events.  Appears to be at baseline mentation at this time.  Denies pain or complaints.       Objective     Last Recorded Vitals  /76 (BP Location: Left arm, Patient Position: Sitting)   Pulse 53   Temp 36.3 °C (97.3 °F) (Oral)   Resp 18   Wt 49.5 kg (109 lb 2 oz)   SpO2 96%   Intake/Output last 3 Shifts:    Intake/Output Summary (Last 24 hours) at 2/16/2025 0910  Last data filed at 2/16/2025 0346  Gross per 24 hour   Intake 560 ml   Output 1250 ml   Net -690 ml       Admission Weight  Weight: (!) 43.1 kg (95 lb) (02/10/25 1533)    Daily Weight  02/15/25 : 49.5 kg (109 lb 2 oz)    Image Results  XR pelvis 1-2 views  Narrative: Interpreted By:  Camryn Huitron,   STUDY:  XR PELVIS 1-2 VIEWS 2/12/2025 3:55 pm      INDICATION:  Signs/Symptoms:Post op hip      COMPARISON:  02/10/2025      ACCESSION NUMBER(S):  [id number]      ORDERING CLINICIAN:  EKTA AMBROCIO      TECHNIQUE:  AP view of the pelvis      FINDINGS:  There is postoperative change from left hip arthroplasty with  satisfactory positioning of the prosthesis. No acute bony abnormality  is seen.      Impression: Satisfactory postoperative appearance following left hip  hemiarthroplasty.      Signed by: Camryn Huitron 2/12/2025 4:02 PM  Dictation workstation:   [id number]      Physical Exam  Vitals and nursing note reviewed.   Constitutional:       Appearance: Normal appearance.      Comments: Frail   HENT:      Head: Normocephalic and atraumatic.      Right Ear: External ear normal.      Left Ear: External ear normal.      Nose: Nose normal.      Mouth/Throat:      Mouth: Mucous membranes are moist.      Comments: Poor dentition   Eyes:      Extraocular Movements: Extraocular movements intact.      Conjunctiva/sclera: Conjunctivae normal.   Cardiovascular:       Rate and Rhythm: Normal rate.      Pulses: Normal pulses.      Heart sounds: Normal heart sounds.   Pulmonary:      Effort: Pulmonary effort is normal.      Breath sounds: Normal breath sounds.   Abdominal:      General: Bowel sounds are normal.      Palpations: Abdomen is soft.   Musculoskeletal:         General: Normal range of motion.      Cervical back: Normal range of motion and neck supple.      Comments: ROM LLE slightly limited due to pain   Otherwise normal ROM    Skin:     General: Skin is warm and dry.      Capillary Refill: Capillary refill takes less than 2 seconds.      Comments: Left hip dressing clean dry intact   Neurological:      Mental Status: She is alert. Mental status is at baseline.      Comments: Appears to be at baseline mentation   Psychiatric:         Mood and Affect: Mood normal.         Behavior: Behavior normal.         Relevant Results               Assessment/Plan        Assessment & Plan  Closed displaced midcervical fracture of left femur  S/p left hip hemiarthroplasty  Pain management   Anticoagulation per orthopedics.  Delirium  Resolved  Continue melatonin   Consider olanzapine if further issues.  Psychiatry Consult  Fall, initial encounter  PT OT   Falls precautions   Anemia  Low stable  Daily CBC    Elevated Cr  Resolved  IV fluids have been discontinued            2/13/25: Denies pain or shortness of breath. Does appear to still be with some mild delirium from my standpoint. Will appreciate psychiatry eval.  Patient also complaints of mild, chronic dysphagia over the last 6-12 months. It is worth adding here that I did witness patient swallow cake from her lunch tray without issue. Regardless in the context of this admission in addition to low BMI (17) will obtain Dietician consult to assess overall nutrition status as well as SLP eval to investigate dysphagia. Modified diet in the interim. Add aspiration precautions.     2/14/25: Appreciate improved mentation today. Denies  pain or complaints. Noted SLP eval yesterday; recommending MBSS to assess swallowing. Also noted mildly elevated renal function will start gentle IVF. SNF placement pending.     2/15/25: Denies pain or complaints. Chart reviewed; patient has been refusing MBSS. Appreciate improved renal function will DC IVF. Delirium appears to have resolved patient is alert and oriented x3. Appreciate psychiatry eval. SNF placement pending.    2/16/2025: Denies pain or complaints.  Patient appears to be neurological baseline at this time.  SNF placement pending.         Nina Saenz, APRN-CNP

## 2025-02-16 NOTE — CARE PLAN
The patient's goals for the shift include      The clinical goals for the shift include manage pain    Over the shift, the patient did not make progress toward the following goals. Barriers to progression include pain. Recommendations to address these barriers include manage pain.

## 2025-02-16 NOTE — PROGRESS NOTES
02/16/25 1148   Discharge Planning   Type of Post Acute Facility Services Skilled nursing;Rehab   Expected Discharge Disposition SNF   Does the patient need discharge transport arranged? Yes   RoundTrip coordination needed? Yes   Has discharge transport been arranged? No     CM spoke to patients daughter Marley Cruz (Child) [phone] (H) via phone and received additional emmett choices. Clinicals uploaded and referral sent to the following:   [facility] (initial choice, no beds per allscripts)  [facility]     Pt is nearing her cognitive baseline. No sitter in place.     Discharge plan finalized. Please contact [facility] prior to attempting to discharge this patient. Thank you.

## 2025-02-17 ENCOUNTER — APPOINTMENT (OUTPATIENT)
Dept: RADIOLOGY | Facility: HOSPITAL | Age: 85
DRG: 521 | End: 2025-02-17
Payer: MEDICARE

## 2025-02-17 VITALS
TEMPERATURE: 97.9 F | DIASTOLIC BLOOD PRESSURE: 66 MMHG | BODY MASS INDEX: 20.08 KG/M2 | HEART RATE: 83 BPM | RESPIRATION RATE: 18 BRPM | WEIGHT: 109.13 LBS | SYSTOLIC BLOOD PRESSURE: 123 MMHG | HEIGHT: 62 IN | OXYGEN SATURATION: 100 %

## 2025-02-17 PROBLEM — D64.9 ANEMIA: Status: RESOLVED | Noted: 2025-02-12 | Resolved: 2025-02-17

## 2025-02-17 PROBLEM — S72.032A CLOSED DISPLACED MIDCERVICAL FRACTURE OF LEFT FEMUR: Status: RESOLVED | Noted: 2025-02-10 | Resolved: 2025-02-17

## 2025-02-17 PROBLEM — R41.0 DELIRIUM: Status: RESOLVED | Noted: 2025-02-12 | Resolved: 2025-02-17

## 2025-02-17 LAB
ALBUMIN SERPL BCP-MCNC: 2.7 G/DL (ref 3.4–5)
ANION GAP SERPL CALCULATED.3IONS-SCNC: 10 MMOL/L (ref 10–20)
BASOPHILS # BLD AUTO: 0.05 X10*3/UL (ref 0–0.1)
BASOPHILS NFR BLD AUTO: 0.9 %
BUN SERPL-MCNC: 27 MG/DL (ref 6–23)
CALCIUM SERPL-MCNC: 8.7 MG/DL (ref 8.6–10.3)
CHLORIDE SERPL-SCNC: 107 MMOL/L (ref 98–107)
CO2 SERPL-SCNC: 28 MMOL/L (ref 21–32)
CREAT SERPL-MCNC: 0.82 MG/DL (ref 0.5–1.05)
EGFRCR SERPLBLD CKD-EPI 2021: 72 ML/MIN/1.73M*2
EOSINOPHIL # BLD AUTO: 0.24 X10*3/UL (ref 0–0.4)
EOSINOPHIL NFR BLD AUTO: 4.1 %
ERYTHROCYTE [DISTWIDTH] IN BLOOD BY AUTOMATED COUNT: 12.9 % (ref 11.5–14.5)
GLUCOSE SERPL-MCNC: 97 MG/DL (ref 74–99)
HCT VFR BLD AUTO: 30.8 % (ref 36–46)
HGB BLD-MCNC: 9.7 G/DL (ref 12–16)
IMM GRANULOCYTES # BLD AUTO: 0.02 X10*3/UL (ref 0–0.5)
IMM GRANULOCYTES NFR BLD AUTO: 0.3 % (ref 0–0.9)
LYMPHOCYTES # BLD AUTO: 1.27 X10*3/UL (ref 0.8–3)
LYMPHOCYTES NFR BLD AUTO: 21.6 %
MCH RBC QN AUTO: 32.1 PG (ref 26–34)
MCHC RBC AUTO-ENTMCNC: 31.5 G/DL (ref 32–36)
MCV RBC AUTO: 102 FL (ref 80–100)
MONOCYTES # BLD AUTO: 0.65 X10*3/UL (ref 0.05–0.8)
MONOCYTES NFR BLD AUTO: 11.1 %
NEUTROPHILS # BLD AUTO: 3.65 X10*3/UL (ref 1.6–5.5)
NEUTROPHILS NFR BLD AUTO: 62 %
NRBC BLD-RTO: 0 /100 WBCS (ref 0–0)
PHOSPHATE SERPL-MCNC: 3.8 MG/DL (ref 2.5–4.9)
PLATELET # BLD AUTO: 228 X10*3/UL (ref 150–450)
POTASSIUM SERPL-SCNC: 3.7 MMOL/L (ref 3.5–5.3)
RBC # BLD AUTO: 3.02 X10*6/UL (ref 4–5.2)
SODIUM SERPL-SCNC: 141 MMOL/L (ref 136–145)
WBC # BLD AUTO: 5.9 X10*3/UL (ref 4.4–11.3)

## 2025-02-17 PROCEDURE — 2500000001 HC RX 250 WO HCPCS SELF ADMINISTERED DRUGS (ALT 637 FOR MEDICARE OP): Performed by: INTERNAL MEDICINE

## 2025-02-17 PROCEDURE — 36415 COLL VENOUS BLD VENIPUNCTURE: CPT | Performed by: INTERNAL MEDICINE

## 2025-02-17 PROCEDURE — 2500000004 HC RX 250 GENERAL PHARMACY W/ HCPCS (ALT 636 FOR OP/ED): Performed by: INTERNAL MEDICINE

## 2025-02-17 PROCEDURE — 85025 COMPLETE CBC W/AUTO DIFF WBC: CPT | Performed by: INTERNAL MEDICINE

## 2025-02-17 PROCEDURE — 2500000001 HC RX 250 WO HCPCS SELF ADMINISTERED DRUGS (ALT 637 FOR MEDICARE OP)

## 2025-02-17 PROCEDURE — 99239 HOSP IP/OBS DSCHRG MGMT >30: CPT | Performed by: NURSE PRACTITIONER

## 2025-02-17 PROCEDURE — 80069 RENAL FUNCTION PANEL: CPT | Performed by: INTERNAL MEDICINE

## 2025-02-17 RX ORDER — HYDROXYZINE HYDROCHLORIDE 10 MG/1
10 TABLET, FILM COATED ORAL EVERY 4 HOURS PRN
Qty: 30 TABLET | Refills: 0 | Status: SHIPPED | OUTPATIENT
Start: 2025-02-17

## 2025-02-17 RX ORDER — PANTOPRAZOLE SODIUM 40 MG/1
40 TABLET, DELAYED RELEASE ORAL
Start: 2025-02-18

## 2025-02-17 RX ORDER — CLOTRIMAZOLE 1 %
CREAM (GRAM) TOPICAL 2 TIMES DAILY
Start: 2025-02-17

## 2025-02-17 RX ORDER — POLYETHYLENE GLYCOL 3350 17 G/17G
17 POWDER, FOR SOLUTION ORAL DAILY
Start: 2025-02-18

## 2025-02-17 RX ORDER — ASPIRIN 325 MG
325 TABLET, DELAYED RELEASE (ENTERIC COATED) ORAL 2 TIMES DAILY
Start: 2025-02-17

## 2025-02-17 RX ORDER — DOCUSATE SODIUM 100 MG/1
100 CAPSULE, LIQUID FILLED ORAL 2 TIMES DAILY
Start: 2025-02-17

## 2025-02-17 RX ORDER — LIDOCAINE 560 MG/1
1 PATCH PERCUTANEOUS; TOPICAL; TRANSDERMAL EVERY 24 HOURS
Start: 2025-02-17

## 2025-02-17 RX ORDER — GABAPENTIN 300 MG/1
300 CAPSULE ORAL DAILY
Qty: 30 CAPSULE | Refills: 0 | Status: SHIPPED | OUTPATIENT
Start: 2025-02-17 | End: 2025-03-19

## 2025-02-17 RX ORDER — HYDROCODONE BITARTRATE AND ACETAMINOPHEN 5; 325 MG/1; MG/1
1 TABLET ORAL EVERY 6 HOURS PRN
Qty: 20 TABLET | Refills: 0 | Status: SHIPPED | OUTPATIENT
Start: 2025-02-17 | End: 2025-02-24

## 2025-02-17 RX ORDER — OLANZAPINE 2.5 MG/1
2.5 TABLET ORAL EVERY 8 HOURS PRN
Start: 2025-02-17

## 2025-02-17 RX ADMIN — POLYETHYLENE GLYCOL 3350 17 G: 17 POWDER, FOR SOLUTION ORAL at 08:30

## 2025-02-17 RX ADMIN — ALLOPURINOL 300 MG: 300 TABLET ORAL at 08:30

## 2025-02-17 RX ADMIN — ACETAMINOPHEN 975 MG: 325 TABLET, FILM COATED ORAL at 09:57

## 2025-02-17 RX ADMIN — ASPIRIN 325 MG: 325 TABLET, COATED ORAL at 08:30

## 2025-02-17 RX ADMIN — METOPROLOL SUCCINATE 50 MG: 50 TABLET, EXTENDED RELEASE ORAL at 08:33

## 2025-02-17 RX ADMIN — DOCUSATE SODIUM 100 MG: 100 CAPSULE, LIQUID FILLED ORAL at 08:30

## 2025-02-17 RX ADMIN — PSYLLIUM HUSK 1 PACKET: 3.4 POWDER ORAL at 08:30

## 2025-02-17 RX ADMIN — PANTOPRAZOLE SODIUM 40 MG: 40 TABLET, DELAYED RELEASE ORAL at 06:09

## 2025-02-17 RX ADMIN — ACETAMINOPHEN 975 MG: 325 TABLET, FILM COATED ORAL at 01:49

## 2025-02-17 RX ADMIN — HYDROCODONE BITARTRATE AND ACETAMINOPHEN 1 TABLET: 5; 325 TABLET ORAL at 06:09

## 2025-02-17 RX ADMIN — GABAPENTIN 300 MG: 300 CAPSULE ORAL at 08:30

## 2025-02-17 ASSESSMENT — COGNITIVE AND FUNCTIONAL STATUS - GENERAL
STANDING UP FROM CHAIR USING ARMS: A LOT
EATING MEALS: A LITTLE
MOVING FROM LYING ON BACK TO SITTING ON SIDE OF FLAT BED WITH BEDRAILS: A LITTLE
MOBILITY SCORE: 11
DRESSING REGULAR LOWER BODY CLOTHING: A LOT
CLIMB 3 TO 5 STEPS WITH RAILING: TOTAL
HELP NEEDED FOR BATHING: A LOT
TOILETING: A LOT
DRESSING REGULAR UPPER BODY CLOTHING: A LOT
DAILY ACTIVITIY SCORE: 14
PERSONAL GROOMING: A LITTLE
TURNING FROM BACK TO SIDE WHILE IN FLAT BAD: A LOT
WALKING IN HOSPITAL ROOM: TOTAL
MOVING TO AND FROM BED TO CHAIR: A LOT

## 2025-02-17 ASSESSMENT — PAIN - FUNCTIONAL ASSESSMENT
PAIN_FUNCTIONAL_ASSESSMENT: 0-10

## 2025-02-17 ASSESSMENT — PAIN SCALES - GENERAL
PAINLEVEL_OUTOF10: 6
PAINLEVEL_OUTOF10: 3
PAINLEVEL_OUTOF10: 1
PAINLEVEL_OUTOF10: 0 - NO PAIN
PAINLEVEL_OUTOF10: 4

## 2025-02-17 ASSESSMENT — PAIN DESCRIPTION - ORIENTATION: ORIENTATION: LEFT

## 2025-02-17 ASSESSMENT — PAIN DESCRIPTION - LOCATION: LOCATION: HIP

## 2025-02-17 NOTE — PROGRESS NOTES
Spiritual Care Visit  Spiritual Care Request    Reason for Visit:  Routine Visit: Introduction     Request Received From:       Focus of Care:  Visited With: Patient         Refer to :          Spiritual Care Assessment    Spiritual Assessment:                      Care Provided:  Intended Effects: Establish rapport and connectedness, Build relationship of care and support, Demonstrate caring and concern  Methods: Offer emotional support    Sense of Community and or Samaritan Affiliation:  No Buddhist on file         Addressed Needs/Concerns and/or [place] Through:  Samaritan Encounters  Samaritan Needs: Prayer, Samaritan articles (Prayer Rodriguez)       Outcome:        Advance Directives:         Spiritual Care Annotation    Annotation:  Patient received a visit from the Spiritual care volunteer while admitted.  This patient was offered emotional and spiritual support no other needs were expressed. Spiritual care will remain available for support as requested.     [place] Initial : Margarito DUGGAN    Reviewed and Submitted by Chaplain Nath

## 2025-02-17 NOTE — DISCHARGE SUMMARY
Discharge Diagnosis  Closed displaced midcervical fracture of left femur S/p left hip hemiarthroplasty  Delirium  Resolved  Psychiatry signed off  Fall   evaded creatinine resolved   anemia    Issues Requiring Follow-Up  Follow-up with Ortho surgeon    Discharge Meds     Medication List      START taking these medications     clotrimazole 1 % cream; Commonly known as: Lotrimin; Apply topically 2   times a day.   docusate sodium 100 mg capsule; Commonly known as: Colace; Take 1   capsule (100 mg) by mouth 2 times a day.   HYDROcodone-acetaminophen 5-325 mg tablet; Commonly known as: Norco;   Take 1 tablet by mouth every 6 hours if needed for severe pain (7 - 10) or   moderate pain (4 - 6) for up to 7 days.   hydrOXYzine HCL 10 mg tablet; Commonly known as: Atarax; Take 1 tablet   (10 mg) by mouth every 4 hours if needed for anxiety.   lidocaine 4 % patch; Place 1 patch over 12 hours on the skin once every   24 hours. Remove & discard patch within 12 hours or as directed by MD.   OLANZapine 2.5 mg tablet; Commonly known as: ZyPREXA; Take 1 tablet (2.5   mg) by mouth every 8 hours if needed (Agitation).   pantoprazole 40 mg EC tablet; Commonly known as: ProtoNix; Take 1 tablet   (40 mg) by mouth once daily in the morning. Take before meals. Do not   crush, chew, or split.; Start taking on: February 18, 2025   polyethylene glycol 17 gram packet; Commonly known as: Glycolax,   Miralax; Take 17 g by mouth once daily.; Start taking on: February 18, 2025   psyllium 3.4 gram packet; Commonly known as: Metamucil; Take 1 packet by   mouth once daily. Mix and drink with at least 8 ounces of water or juice.;   Start taking on: February 18, 2025     CHANGE how you take these medications     aspirin 325 mg EC tablet; Take 1 tablet (325 mg) by mouth 2 times a   day.; What changed: medication strength, how much to take, when to take   this     CONTINUE taking these medications     allopurinol 300 mg tablet; Commonly known as:  Zyloprim   gabapentin 300 mg capsule; Commonly known as: Neurontin; Take 1 capsule   (300 mg) by mouth once daily.   metoprolol succinate XL 50 mg 24 hr tablet; Commonly known as: Toprol-XL   multivitamin tablet   VITAMIN D3 ORAL     STOP taking these medications     Aleve 220 mg tablet; Generic drug: naproxen sodium       Test Results Pending At Discharge  Pending Labs       No current pending labs.            Hospital Course  HPI from admission:  Cecelia Chávez is a 84 y.o. female presenting with hip fracture..  She tripped and fell and was unable to get up and since arrival in the ER it has been found that she has left hip fracture.  Is feeling fine before this happened.  She denies any syncope or anything else that suddenly made her collapse.     Brief Hospital course:  This is a 84 y.o. female presented with mechanical fall, left hip fracture.  Admitted with Closed displaced midcervical fracture of left femur SP left femur ORIF. Patient had acute hospital delirium and mildly elevated creatinine.  Evaluated by psych, adjust her medication and okay to be discharged.  Patient was complaining of chronic dysphagia evaluated by speech therapy but patient refused MBS.  Patient is hemodynamically stable, will discharge to skilled of nursing    Pertinent Physical Exam At Time of Discharge  Physical Exam  General: alert, no diaphoresis   HENT: mucous membranes moist, external ears normal, no rhinorrhea   Eyes: no icterus or injection, no discharge   Lungs: CTA BL   Heart: RRR, no murmurs, no LE edema BL   GI: abdomen soft, nontender, nondistended, BS present   MSK:  ROM LLE slightly limited due to pain Otherwise normal ROM     Skin: Left hip dressing clean dry intact  no rashes, erythema, or ecchymosis   Neuro: Appears alert at her baseline mentation  Psychiatric: Mood normal behavior normal    Outpatient Follow-Up  No future appointments.    I spent 35 minutes in professional and overall care on this patient       Mary  Héctor, RONNY-CNP

## 2025-02-17 NOTE — PROGRESS NOTES
Speech-Language Pathology                 Therapy Communication Note    Patient Name: Cecelia Chávez  MRN: [id number]  Department: [address]  Room: [address] [place] [address]  Today's Date: 2/17/2025     Discipline: Speech Language Pathology          Missed Visit Reason:  Pt refusing MBSS this morning (this is the second attempt since recommendation)  per radiology tech, pt to be discharged and MBSS order will be canceled. If pt changes her mind, she can pursue MBSS as an out patient, discussed with RN    Missed Time: Cancel    Comment:

## 2025-02-17 NOTE — NURSING NOTE
Pt repositioned for comfort, pt medicated for complaints of left hip pain, see MAR she stated is feels like it is on fire, ice pack given as well at this time

## 2025-02-17 NOTE — NURSING NOTE
Assumed care of pt no complaints other than pain noted very teary eyed . Had pt order her breakfast and distraction

## 2025-02-17 NOTE — NURSING NOTE
"Pt rounds, pt thinking she was at home, nurse reminded her she was in the hospital, states, \"oh that is right.\"  Pt was easily redirected  "

## 2025-02-17 NOTE — CARE PLAN
The patient's goals for the shift include      The clinical goals for the shift include manage pain, stable VS    Over the shift, the patient did not make progress toward the following goals. Barriers to progression include pain. Recommendations to address these barriers include manage pain.

## 2025-02-17 NOTE — PROGRESS NOTES
Pts daughter to be in after work, around 3 pm.      02/17/25 1252   Discharge Planning   Expected Discharge Disposition SNF  ([facility])   Does the patient need discharge transport arranged? Yes   RoundTrip coordination needed? Yes   Has discharge transport been arranged? Yes   What day is the transport expected? 02/17/25   What time is the transport expected? [id number]

## 2025-02-17 NOTE — CARE PLAN
The patient's goals for the shift include      The clinical goals for the shift include manage pain, stable VS    Over the shift, the patient did not make progress toward the following goals. Barriers to progression include SNF PLACEMENT. Recommendations to address these barriers include SNF.

## 2025-02-17 NOTE — PROGRESS NOTES
[facility] able to obtain requested information to [facility], they are now stating they need clarity on pts plan for nutrition as pt has declined MBS, [facility], wait for response. Pt cannot dc until [facility] gives the ok, they do have a bed available for the pt and no precert is needed.      02/17/25 1217   Discharge Planning   Expected Discharge Disposition SNF  ([facility])

## 2025-03-20 ENCOUNTER — TELEPHONE (OUTPATIENT)
Dept: PRIMARY CARE | Facility: CLINIC | Age: 85
End: 2025-03-20
Payer: MEDICARE

## 2025-03-20 NOTE — TELEPHONE ENCOUNTER
JT from [facility] would like Verbal orders for Skilled nursing, OT, PT and ST    JT can be reached at [phone]    JT is aware that [facility] is out of the office

## 2025-04-11 ENCOUNTER — HOSPITAL ENCOUNTER (OUTPATIENT)
Dept: RADIOLOGY | Facility: HOSPITAL | Age: 85
Discharge: HOME | End: 2025-04-11
Payer: MEDICARE

## 2025-04-11 DIAGNOSIS — R22.42 LOCALIZED SWELLING, MASS AND LUMP, LEFT LOWER LIMB: ICD-10-CM

## 2025-04-11 PROCEDURE — 93971 EXTREMITY STUDY: CPT

## 2025-04-22 ENCOUNTER — OFFICE VISIT (OUTPATIENT)
Dept: PRIMARY CARE | Facility: CLINIC | Age: 85
End: 2025-04-22
Payer: MEDICARE

## 2025-04-22 VITALS
HEIGHT: 62 IN | DIASTOLIC BLOOD PRESSURE: 81 MMHG | WEIGHT: 98 LBS | BODY MASS INDEX: 18.03 KG/M2 | SYSTOLIC BLOOD PRESSURE: 197 MMHG | HEART RATE: 60 BPM | OXYGEN SATURATION: 94 %

## 2025-04-22 DIAGNOSIS — M15.0 PRIMARY OSTEOARTHRITIS INVOLVING MULTIPLE JOINTS: ICD-10-CM

## 2025-04-22 DIAGNOSIS — M25.552 LEFT HIP PAIN: Primary | ICD-10-CM

## 2025-04-22 DIAGNOSIS — M41.25 OTHER IDIOPATHIC SCOLIOSIS, THORACOLUMBAR REGION: ICD-10-CM

## 2025-04-22 PROCEDURE — G2211 COMPLEX E/M VISIT ADD ON: HCPCS | Performed by: FAMILY MEDICINE

## 2025-04-22 PROCEDURE — 1159F MED LIST DOCD IN RCRD: CPT | Performed by: FAMILY MEDICINE

## 2025-04-22 PROCEDURE — 3079F DIAST BP 80-89 MM HG: CPT | Performed by: FAMILY MEDICINE

## 2025-04-22 PROCEDURE — 1123F ACP DISCUSS/DSCN MKR DOCD: CPT | Performed by: FAMILY MEDICINE

## 2025-04-22 PROCEDURE — 3077F SYST BP >= 140 MM HG: CPT | Performed by: FAMILY MEDICINE

## 2025-04-22 PROCEDURE — 99214 OFFICE O/P EST MOD 30 MIN: CPT | Performed by: FAMILY MEDICINE

## 2025-04-22 RX ORDER — LIDOCAINE 50 MG/G
1 PATCH TOPICAL DAILY
Qty: 30 PATCH | Refills: 0 | Status: SHIPPED | OUTPATIENT
Start: 2025-04-22 | End: 2025-05-22

## 2025-04-22 ASSESSMENT — ENCOUNTER SYMPTOMS: ARTHRALGIAS: 1

## 2025-04-22 NOTE — PROGRESS NOTES
"Subjective   Patient ID: Cecelia Chávez is a 84 y.o. female who presents for Hospital Follow-up (Follow up hospital/rehab after a fall/Pt wants pain medication told her she would need referral /Pt has pain in left hip and legs and tingling in toes /Pt has slight dizziness).    1) Hospital/Rehab follow-up for closed displaced midcervical fracture of left femur S/p left hip hemiarthroplasty: fracture was caused by a fall, continues to have PT and OT at home, left hip pain is severe per patient and is requesting a refill for hydrocodone which I informed her I do not prescribe, patient referred to pain management when she was seen in November and never made the appointment, last appointment with ortho was a few weeks ago, daughter made a follow-up appointment with Dr. Patel while in the office for tomorrow.  2) Patient requests a disability placard.  She had one from [place] that .      Review of Systems   Musculoskeletal:  Positive for arthralgias (left hip).     Objective   BP (!) 197/81 (BP Location: Right arm, Patient Position: Sitting, BP Cuff Size: Adult)   Pulse 60   Ht 1.575 m (5' 2\")   Wt (!) 44.5 kg (98 lb)   SpO2 94%   BMI 17.92 kg/m²     Physical Exam  Vitals and nursing note reviewed.   Constitutional:       Appearance: Normal appearance. She is not ill-appearing.      Comments: Sitting in wheelchair, accompanied by daughter.   Cardiovascular:      Rate and Rhythm: Normal rate and regular rhythm.      Heart sounds: Normal heart sounds.   Pulmonary:      Effort: Pulmonary effort is normal.      Breath sounds: Normal breath sounds.   Neurological:      Mental Status: She is alert.   Psychiatric:         Behavior: Behavior normal.      Comments: Anxious mood about left hip pain.     Assessment/Plan   Diagnoses and all orders for this visit:  Left hip pain  New  S/P surgery  Patient will obtain hydrocodone refill from ortho or pain management.  Lidocaine (Lidoderm) 5 % patch prescribed.  Keep " follow-up appointments with ortho.  Await input.  Follow up as directed.    Primary osteoarthritis involving multiple joints/Other idiopathic scoliosis, thoracolumbar region  Disability Placard

## 2025-04-24 ENCOUNTER — TELEPHONE (OUTPATIENT)
Dept: PRIMARY CARE | Facility: CLINIC | Age: 85
End: 2025-04-24
Payer: MEDICARE

## 2025-04-24 DIAGNOSIS — G89.4 CHRONIC PAIN SYNDROME: ICD-10-CM

## 2025-04-24 NOTE — TELEPHONE ENCOUNTER
Patients daughter Marley calling [phone]. The referral to pain management that was given in 2024, is now . They need a new referral to pain management before they can schedule.

## 2025-06-02 NOTE — PROGRESS NOTES
"[facility]  New Patient Office Visit Note 6/3/2025    Patient Information: Cecelia Chávez, MRN: [id number], : 1940   Primary Care/Referring Physician: Erin Hickey DO, [address] [facility] [address] / [place]*     Chief Complaint: Left groin, lateral thigh, and left knee pain  History of Pain: Ms. Cecelia Chávez is a 84 y.o. female with a PMHx of HTN, HLD, COPD, GERD who presents for left groin, lateral thigh, and left knee pain.    She reports a fall in 2025 causing left femoral neck fracture. She states her legs \"gave out\" on her at the time. She has had falls in the past but never fractured anything previously. She underwent left hip hemiarthroplasty on 25 but continues to struggle with pain. She describes left groin pain, left lateral thigh pain, and left knee pain. She has some pain radiating down her left leg. Her pain worsens with prolonged standing. She has difficulty with left hip flexion due to left knee pain. She feels that she was making some progress while undergoing physical therapy but since this stopped she has been less mobile and functional.     Current Pain Medications: Gabapentin 300 mg TID, Tylenol - minimal relief  Previously Tried Pain Medications: Aleve - minimal relief    Relevant Surgeries: Underwent some type of lumbar spine surgery in the past (?related to her scoliosis) and left hip hemiarthroplasty in 2025.  Injections: Denies  Physical/Occupational Therapy: She completed PT after her hip surgery, including home PT. She finished this 2 weeks ago    Medications:   Current Outpatient Medications   Medication Instructions    allopurinol (ZYLOPRIM) 300 mg, Daily    aspirin 325 mg, oral, 2 times daily    celecoxib (CELEBREX) 50 mg, oral, 2 times daily    cholecalciferol, vitamin D3, (VITAMIN D3 ORAL) 1 tablet, Daily    clotrimazole (Lotrimin) 1 % cream Topical, 2 times daily    docusate sodium (COLACE) 100 mg, oral, 2 times daily    gabapentin " (NEURONTIN) 300 mg, oral, Daily    hydrOXYzine HCL (ATARAX) 10 mg, oral, Every 4 hours PRN    metoprolol succinate XL (TOPROL-XL) 50 mg, Daily    multivitamin tablet 1 tablet, Daily    OLANZapine (ZYPREXA) 2.5 mg, oral, Every 8 hours PRN    pantoprazole (PROTONIX) 40 mg, oral, Daily before breakfast, Do not crush, chew, or split.    polyethylene glycol (GLYCOLAX, MIRALAX) 17 g, oral, Daily    psyllium (Metamucil) 3.4 gram packet 1 packet, oral, Daily, Mix and drink with at least 8 ounces of water or juice.      Allergies: Allergies[1]    Past Medical & Surgical History:  Medical History[2] Surgical History[3]    Family History[4]  Social History     Socioeconomic History    Marital status: Single     Spouse name: Not on file    Number of children: Not on file    Years of education: Not on file    Highest education level: Not on file   Occupational History    Not on file   Tobacco Use    Smoking status: Former     Current packs/day: 0.25     Average packs/day: 0.3 packs/day for 54.5 years (13.6 ttl pk-yrs)     Types: Cigarettes     Start date: 12/7/1970    Smokeless tobacco: Never   Vaping Use    Vaping status: Never Used   Substance and Sexual Activity    Alcohol use: Yes     Comment: rare    Drug use: Never    Sexual activity: Not Currently     Birth control/protection: Abstinence   Other Topics Concern    Not on file   Social History Narrative    Not on file     Social Drivers of Health     Financial Resource Strain: Low Risk  (2/12/2025)    Overall Financial Resource Strain (CARDIA)     Difficulty of Paying Living Expenses: Not hard at all   Food Insecurity: No Food Insecurity (2/12/2025)    Hunger Vital Sign     Worried About Running Out of Food in the Last Year: Never true     Ran Out of Food in the Last Year: Never true   Transportation Needs: No Transportation Needs (2/12/2025)    PRAPARE - Transportation     Lack of Transportation (Medical): No     Lack of Transportation (Non-Medical): No   Physical  Activity: Insufficiently Active (2/12/2025)    Exercise Vital Sign     Days of Exercise per Week: 5 days     Minutes of Exercise per Session: 10 min   Stress: No Stress Concern Present (2/12/2025)    [employer] [place] [employer] - Occupational Stress Questionnaire     Feeling of Stress : Only a little   Social Connections: Unknown (2/12/2025)    Social Connection and Isolation Panel [NHANES]     Frequency of Communication with Friends and Family: More than three times a week     Frequency of Social Gatherings with Friends and Family: More than three times a week     Attends Church Services: Never     Active Member of Clubs or Organizations: No     Attends Club or Organization Meetings: Never     Marital Status: Not on file   Intimate Partner Violence: Not At Risk (2/12/2025)    Humiliation, Afraid, Rape, and Kick questionnaire     Fear of Current or Ex-Partner: No     Emotionally Abused: No     Physically Abused: No     Sexually Abused: No   Housing Stability: Low Risk  (2/12/2025)    Housing Stability Vital Sign     Unable to Pay for Housing in the Last Year: No     Number of Times Moved in the Last Year: 1     Homeless in the Last Year: No       Problems, Past medical history, past surgical history, Medications, allergies, social and family history reviewed and as per the electronic medical record from today's encounter    Review of Systems:  CONST: No fever, chills, fatigue, weight changes  EYES: No loss of vision  ENT: No tinnitus. Reports decreased hearing  CV: No chest pain, palpitations  RESP: No dyspnea, shortness of breath, cough  GI: No stool incontinence, nausea, vomiting  : No urinary incontinence  MSK: No joint swelling  SKIN: No rash, no hives  NEURO: No headache, dizziness  PSYCH: No anxiety, depression or suicidal ideation  HEM/LYMPH: No easy bruising or bleeding  All other systems reviewed are negative     Physical Exam:  Vitals: /68   Pulse 66   Resp 18   Ht 1.575 m (5'  "2\")   Wt (!) 44.5 kg (98 lb)   SpO2 98%   BMI 17.92 kg/m²   General: No apparent distress. Alert, appropriate, oriented x 3. Mood and affect normal. Speaking in full sentences.  HENT: Normocephalic, atraumatic. Hearing appears diminished, frequently asking for things to be repeated  Eyes: Extraocular movements grossly intact. Pupils equal and round.   Neck: Supple, trachea midline.  Lungs: Symmetric respiratory excursion on visual exam, nonlabored breathing.  Extremities: No clubbing, cyanosis, or edema noted in arms or legs.  Skin: No rashes, lesions, alopecia noted on back or extremities.   MSK: Reports pain with left hip scour maneuver. Reports pain to palpation overlying her left anterolateral knee joint line. No tenderness to palpation of left GTB  Neuro: Alert and appropriate. Using a wheelchair. Bulk and tone within normal limits.    Laboratory Data:  The following laboratory data were reviewed during this visit:   Lab Results   Component Value Date    WBC 5.9 02/17/2025    RBC 3.02 (L) 02/17/2025    HGB 9.7 (L) 02/17/2025    HCT 30.8 (L) 02/17/2025     02/17/2025      Lab Results   Component Value Date    INR 1.1 02/10/2025     Lab Results   Component Value Date    CREATININE 0.82 02/17/2025       Imaging:  The following imaging impressions were reviewed by me during this visit:    -2/12/25 pelvis xray shows postoperative change from left hip arthroplasty with satisfactory positioning of the prosthesis. No acute bony abnormality is seen       I also personally reviewed the images from the above studies myself. These images and my interpretation of them contributed to the management and decision making of the patient's medical plan.    ASSESSMENT:  Ms. Cecelia Chávez is a 84 y.o. female with left groin and knee pain that is consistent with:    1. Chronic pain of left knee    2. Chronic groin pain, left    3. Chronic pain syndrome        PLAN:    Diagnostics:   - I reviewed her recent post-operative " pelvic xray which was unremarkable. No further diagnostics are indicated at this time but may consider re-imaging in the future given persistent pain  - Recommend a left knee xray    Physical Therapy and Rehabilitation:     - She completed PT approximately 2 weeks ago and feels he pain and function are worsening. Encouraged her to continue her HEP but may consider referral back to PT in the future    Psychologically:  - No needs at this time    Medications  - Recommend trial of Celecoxib 50 mg BID. Education on this medication provided today. Side effects reviewed  - Will attempt to avoid sedating medications as I believe that she is a significant fall risk    Duration  - 4 months    Interventions:  - No plan for intervention at this time. May consider a left knee joint steroid injection in the future if her imaging shows significant OA        Sincerely,  Peter Castle MD  [facility] [place]         [1] No Known Allergies  [2]   Past Medical History:  Diagnosis Date    Ankle fracture, right 11/21/2024    Closed fracture of right distal fibula 11/21/2024    Hamstring tendonitis of right thigh 11/21/2024    Heart disease     Hypertension     Injury of right ankle 11/21/2024    Injury of right foot 11/21/2024   [3]   Past Surgical History:  Procedure Laterality Date    HYSTERECTOMY      SPINE SURGERY     [4]   Family History  Problem Relation Name Age of Onset    Heart disease Son

## 2025-06-03 ENCOUNTER — HOSPITAL ENCOUNTER (OUTPATIENT)
Dept: RADIOLOGY | Facility: CLINIC | Age: 85
Discharge: HOME | End: 2025-06-03
Payer: MEDICARE

## 2025-06-03 ENCOUNTER — OFFICE VISIT (OUTPATIENT)
Dept: PAIN MEDICINE | Facility: CLINIC | Age: 85
End: 2025-06-03
Payer: MEDICARE

## 2025-06-03 VITALS
HEART RATE: 66 BPM | SYSTOLIC BLOOD PRESSURE: 134 MMHG | DIASTOLIC BLOOD PRESSURE: 68 MMHG | WEIGHT: 98 LBS | RESPIRATION RATE: 18 BRPM | OXYGEN SATURATION: 98 % | BODY MASS INDEX: 18.03 KG/M2 | HEIGHT: 62 IN

## 2025-06-03 DIAGNOSIS — G89.29 CHRONIC GROIN PAIN, LEFT: ICD-10-CM

## 2025-06-03 DIAGNOSIS — G89.29 CHRONIC PAIN OF LEFT KNEE: Primary | ICD-10-CM

## 2025-06-03 DIAGNOSIS — M25.562 CHRONIC PAIN OF LEFT KNEE: ICD-10-CM

## 2025-06-03 DIAGNOSIS — G89.4 CHRONIC PAIN SYNDROME: ICD-10-CM

## 2025-06-03 DIAGNOSIS — G89.29 CHRONIC PAIN OF LEFT KNEE: ICD-10-CM

## 2025-06-03 DIAGNOSIS — R10.32 CHRONIC GROIN PAIN, LEFT: ICD-10-CM

## 2025-06-03 DIAGNOSIS — M25.562 CHRONIC PAIN OF LEFT KNEE: Primary | ICD-10-CM

## 2025-06-03 PROCEDURE — 1125F AMNT PAIN NOTED PAIN PRSNT: CPT | Performed by: STUDENT IN AN ORGANIZED HEALTH CARE EDUCATION/TRAINING PROGRAM

## 2025-06-03 PROCEDURE — 73564 X-RAY EXAM KNEE 4 OR MORE: CPT | Mod: LT

## 2025-06-03 PROCEDURE — 99204 OFFICE O/P NEW MOD 45 MIN: CPT | Performed by: STUDENT IN AN ORGANIZED HEALTH CARE EDUCATION/TRAINING PROGRAM

## 2025-06-03 PROCEDURE — 3078F DIAST BP <80 MM HG: CPT | Performed by: STUDENT IN AN ORGANIZED HEALTH CARE EDUCATION/TRAINING PROGRAM

## 2025-06-03 PROCEDURE — 1160F RVW MEDS BY RX/DR IN RCRD: CPT | Performed by: STUDENT IN AN ORGANIZED HEALTH CARE EDUCATION/TRAINING PROGRAM

## 2025-06-03 PROCEDURE — 1159F MED LIST DOCD IN RCRD: CPT | Performed by: STUDENT IN AN ORGANIZED HEALTH CARE EDUCATION/TRAINING PROGRAM

## 2025-06-03 PROCEDURE — 73564 X-RAY EXAM KNEE 4 OR MORE: CPT | Mod: LEFT SIDE | Performed by: STUDENT IN AN ORGANIZED HEALTH CARE EDUCATION/TRAINING PROGRAM

## 2025-06-03 PROCEDURE — 99214 OFFICE O/P EST MOD 30 MIN: CPT | Performed by: STUDENT IN AN ORGANIZED HEALTH CARE EDUCATION/TRAINING PROGRAM

## 2025-06-03 PROCEDURE — G2211 COMPLEX E/M VISIT ADD ON: HCPCS | Performed by: STUDENT IN AN ORGANIZED HEALTH CARE EDUCATION/TRAINING PROGRAM

## 2025-06-03 PROCEDURE — 3075F SYST BP GE 130 - 139MM HG: CPT | Performed by: STUDENT IN AN ORGANIZED HEALTH CARE EDUCATION/TRAINING PROGRAM

## 2025-06-03 RX ORDER — CELECOXIB 50 MG/1
50 CAPSULE ORAL 2 TIMES DAILY
Qty: 60 CAPSULE | Refills: 1 | Status: SHIPPED | OUTPATIENT
Start: 2025-06-03 | End: 2025-08-02

## 2025-06-03 ASSESSMENT — ENCOUNTER SYMPTOMS
OCCASIONAL FEELINGS OF UNSTEADINESS: 1
LOSS OF SENSATION IN FEET: 1
DEPRESSION: 0

## 2025-06-03 ASSESSMENT — PATIENT HEALTH QUESTIONNAIRE - PHQ9
2. FEELING DOWN, DEPRESSED OR HOPELESS: NOT AT ALL
SUM OF ALL RESPONSES TO PHQ9 QUESTIONS 1 AND 2: 0
1. LITTLE INTEREST OR PLEASURE IN DOING THINGS: NOT AT ALL

## 2025-06-03 ASSESSMENT — PAIN DESCRIPTION - DESCRIPTORS: DESCRIPTORS: SHARP

## 2025-06-03 ASSESSMENT — LIFESTYLE VARIABLES: TOTAL SCORE: 5

## 2025-06-03 ASSESSMENT — PAIN - FUNCTIONAL ASSESSMENT: PAIN_FUNCTIONAL_ASSESSMENT: 0-10

## 2025-06-03 ASSESSMENT — PAIN SCALES - GENERAL
PAINLEVEL_OUTOF10: 9
PAINLEVEL_OUTOF10: 9

## 2025-07-09 ENCOUNTER — APPOINTMENT (OUTPATIENT)
Dept: PAIN MEDICINE | Facility: CLINIC | Age: 85
End: 2025-07-09
Payer: MEDICARE
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
## 2025-08-12 DIAGNOSIS — S72.002A CLOSED DISPLACED FRACTURE OF LEFT FEMORAL NECK: ICD-10-CM

## 2025-08-12 DIAGNOSIS — M1A.09X0 IDIOPATHIC CHRONIC GOUT OF MULTIPLE SITES WITHOUT TOPHUS: ICD-10-CM

## 2025-08-13 RX ORDER — ALLOPURINOL 300 MG/1
300 TABLET ORAL DAILY
Qty: 90 TABLET | Refills: 0 | Status: SHIPPED | OUTPATIENT
Start: 2025-08-13

## 2025-08-13 RX ORDER — GABAPENTIN 300 MG/1
300 CAPSULE ORAL DAILY
Qty: 30 CAPSULE | Refills: 0 | OUTPATIENT
Start: 2025-08-13 | End: 2025-09-12

## 2025-08-19 ENCOUNTER — TELEPHONE (OUTPATIENT)
Dept: PRIMARY CARE | Facility: CLINIC | Age: 85
End: 2025-08-19
Payer: MEDICARE

## 2025-09-04 ENCOUNTER — APPOINTMENT (OUTPATIENT)
Dept: PAIN MEDICINE | Facility: CLINIC | Age: 85
End: 2025-09-04
Payer: MEDICARE

## 2025-09-17 ENCOUNTER — APPOINTMENT (OUTPATIENT)
Facility: CLINIC | Age: 85
End: 2025-09-17
Payer: MEDICARE

## (undated) DEVICE — WOUND SYSTEM, DEBRIDEMENT & CLEANING, O.R DUOPAK

## (undated) DEVICE — DRAPE, ISOLATION, INCISE, W/POUCH, STERI DRAPE, 125 X 83 IN, DISPOSABLE, STERILE

## (undated) DEVICE — RETRIEVER, SUTURE, HEWSON

## (undated) DEVICE — DRESSING, MEPILEX BORDER, POST-OP AG, 4 X 12 IN

## (undated) DEVICE — SUTURE, VICRYL, 1, 27 IN, CT-1, VIOLET

## (undated) DEVICE — SOLUTION, IRRIGATION, USP, SODIUM CHLORIDE 0.9%, .9 NACL, 1000 ML, BAG

## (undated) DEVICE — Device

## (undated) DEVICE — DRAPE, LARGE TOWEL, NON- FENESTRATED, 17X23, CLEAR, N/S

## (undated) DEVICE — APPLICATOR, CHLORAPREP, W/ORANGE TINT, 26ML

## (undated) DEVICE — HANDPIECE, INTERPULSE, W/RETRACTABLE COAX FAN, STERILE

## (undated) DEVICE — DRAPE, SHEET, LARGE, 70 X 85IN, STERILE

## (undated) DEVICE — GLOVE, SURGICAL, PROTEXIS PI , 8.0, PF, LF

## (undated) DEVICE — SOLUTION, IRRIGATION, X RX SODIUM CHL 0.9%, 1000ML BTL

## (undated) DEVICE — BLADE, SAW, SAGITTAL, 18.5 X 73 X 0.76 MM, STAINLESS STEEL, STERILE

## (undated) DEVICE — GLOVE, SURGICAL, PROTEXIS PI ORTHO, 8.0, PF, LF

## (undated) DEVICE — SUTURE, MONOCRYL, 4-0, 27 IN, PS-2, UNDYED

## (undated) DEVICE — SUTURE, VICRYL, 0, 36 IN, CT-1, UNDYED

## (undated) DEVICE — SUTURE, FIBERWIRE 2, T-5 TAPER NEEDLE, 38"

## (undated) DEVICE — SUTURE, VICRYL, 2-0, 36 IN, CT-1, UNDYED

## (undated) DEVICE — ELECTRODE, ELECTROSURGICAL, BLADE, EXTENDED

## (undated) DEVICE — STRIP, SKIN CLOSURE, STERI STRIP, REINFORCED, 0.5 X 4 IN

## (undated) DEVICE — SOLUTION, COMPOUND, BENZOIN, TINCTURE, AMPULES, 0.1 CC